# Patient Record
Sex: FEMALE | Race: WHITE | NOT HISPANIC OR LATINO | URBAN - METROPOLITAN AREA
[De-identification: names, ages, dates, MRNs, and addresses within clinical notes are randomized per-mention and may not be internally consistent; named-entity substitution may affect disease eponyms.]

---

## 2018-10-04 ENCOUNTER — INPATIENT (INPATIENT)
Facility: HOSPITAL | Age: 83
LOS: 5 days | Discharge: HOME | DRG: 871 | End: 2018-10-10
Attending: INTERNAL MEDICINE | Admitting: INTERNAL MEDICINE
Payer: MEDICARE

## 2018-10-04 VITALS
SYSTOLIC BLOOD PRESSURE: 84 MMHG | HEART RATE: 79 BPM | TEMPERATURE: 98 F | DIASTOLIC BLOOD PRESSURE: 59 MMHG | OXYGEN SATURATION: 92 % | RESPIRATION RATE: 12 BRPM

## 2018-10-04 DIAGNOSIS — R41.82 ALTERED MENTAL STATUS, UNSPECIFIED: ICD-10-CM

## 2018-10-04 DIAGNOSIS — I63.412 CEREBRAL INFARCTION DUE TO EMBOLISM OF LEFT MIDDLE CEREBRAL ARTERY: ICD-10-CM

## 2018-10-04 LAB
ALBUMIN SERPL ELPH-MCNC: 4 G/DL — SIGNIFICANT CHANGE UP (ref 3.3–5.2)
ALP SERPL-CCNC: 93 U/L — SIGNIFICANT CHANGE UP (ref 40–120)
ALT FLD-CCNC: 14 U/L — SIGNIFICANT CHANGE UP
ANION GAP SERPL CALC-SCNC: 18 MMOL/L — HIGH (ref 5–17)
APTT BLD: 26.9 SEC — LOW (ref 27.5–37.4)
AST SERPL-CCNC: 25 U/L — SIGNIFICANT CHANGE UP
BASE EXCESS BLDV CALC-SCNC: -4.8 MMOL/L — LOW (ref -2–2)
BILIRUB SERPL-MCNC: 0.4 MG/DL — SIGNIFICANT CHANGE UP (ref 0.4–2)
BUN SERPL-MCNC: 19 MG/DL — SIGNIFICANT CHANGE UP (ref 8–20)
CA-I SERPL-SCNC: 1.05 MMOL/L — LOW (ref 1.15–1.33)
CALCIUM SERPL-MCNC: 8.9 MG/DL — SIGNIFICANT CHANGE UP (ref 8.6–10.2)
CHLORIDE BLDV-SCNC: 106 MMOL/L — SIGNIFICANT CHANGE UP (ref 98–107)
CHLORIDE SERPL-SCNC: 101 MMOL/L — SIGNIFICANT CHANGE UP (ref 98–107)
CK SERPL-CCNC: 99 U/L — SIGNIFICANT CHANGE UP (ref 25–170)
CO2 SERPL-SCNC: 21 MMOL/L — LOW (ref 22–29)
CREAT SERPL-MCNC: 1.04 MG/DL — SIGNIFICANT CHANGE UP (ref 0.5–1.3)
EOSINOPHIL # BLD AUTO: 0 K/UL — SIGNIFICANT CHANGE UP (ref 0–0.5)
EOSINOPHIL NFR BLD AUTO: 0.3 % — SIGNIFICANT CHANGE UP (ref 0–6)
GAS PNL BLDV: 139 MMOL/L — SIGNIFICANT CHANGE UP (ref 135–145)
GAS PNL BLDV: SIGNIFICANT CHANGE UP
GAS PNL BLDV: SIGNIFICANT CHANGE UP
GLUCOSE BLDV-MCNC: 146 MG/DL — HIGH (ref 70–99)
GLUCOSE SERPL-MCNC: 148 MG/DL — HIGH (ref 70–115)
HCO3 BLDV-SCNC: 20 MMOL/L — LOW (ref 21–29)
HCT VFR BLD CALC: 52.6 % — HIGH (ref 37–47)
HCT VFR BLDA CALC: 51 — HIGH (ref 39–50)
HGB BLD CALC-MCNC: 16.7 G/DL — HIGH (ref 11.5–15.5)
HGB BLD-MCNC: 16.3 G/DL — HIGH (ref 12–16)
INR BLD: 1 RATIO — SIGNIFICANT CHANGE UP (ref 0.88–1.16)
LACTATE BLDV-MCNC: 4.4 MMOL/L — CRITICAL HIGH (ref 0.5–2)
LIDOCAIN IGE QN: 28 U/L — SIGNIFICANT CHANGE UP (ref 22–51)
LYMPHOCYTES # BLD AUTO: 3.2 K/UL — SIGNIFICANT CHANGE UP (ref 1–4.8)
LYMPHOCYTES # BLD AUTO: 30.4 % — SIGNIFICANT CHANGE UP (ref 20–55)
MCHC RBC-ENTMCNC: 29.8 PG — SIGNIFICANT CHANGE UP (ref 27–31)
MCHC RBC-ENTMCNC: 31 G/DL — LOW (ref 32–36)
MCV RBC AUTO: 96.2 FL — SIGNIFICANT CHANGE UP (ref 81–99)
MONOCYTES # BLD AUTO: 0.3 K/UL — SIGNIFICANT CHANGE UP (ref 0–0.8)
MONOCYTES NFR BLD AUTO: 2.6 % — LOW (ref 3–10)
NEUTROPHILS # BLD AUTO: 6.9 K/UL — SIGNIFICANT CHANGE UP (ref 1.8–8)
NEUTROPHILS NFR BLD AUTO: 66.4 % — SIGNIFICANT CHANGE UP (ref 37–73)
OTHER CELLS CSF MANUAL: 20 ML/DL — SIGNIFICANT CHANGE UP (ref 18–22)
PCO2 BLDV: 58 MMHG — HIGH (ref 35–50)
PH BLDV: 7.23 — LOW (ref 7.32–7.43)
PLATELET # BLD AUTO: 326 K/UL — SIGNIFICANT CHANGE UP (ref 150–400)
PO2 BLDV: 61 MMHG — HIGH (ref 25–45)
POTASSIUM BLDV-SCNC: 7.3 MMOL/L — CRITICAL HIGH (ref 3.4–4.5)
POTASSIUM SERPL-MCNC: 4 MMOL/L — SIGNIFICANT CHANGE UP (ref 3.5–5.3)
POTASSIUM SERPL-SCNC: 4 MMOL/L — SIGNIFICANT CHANGE UP (ref 3.5–5.3)
PROT SERPL-MCNC: 7.3 G/DL — SIGNIFICANT CHANGE UP (ref 6.6–8.7)
PROTHROM AB SERPL-ACNC: 11 SEC — SIGNIFICANT CHANGE UP (ref 9.8–12.7)
RBC # BLD: 5.47 M/UL — HIGH (ref 4.4–5.2)
RBC # FLD: 13.7 % — SIGNIFICANT CHANGE UP (ref 11–15.6)
SAO2 % BLDV: 88 % — SIGNIFICANT CHANGE UP
SODIUM SERPL-SCNC: 140 MMOL/L — SIGNIFICANT CHANGE UP (ref 135–145)
TROPONIN T SERPL-MCNC: <0.01 NG/ML — SIGNIFICANT CHANGE UP (ref 0–0.06)
WBC # BLD: 10.4 K/UL — SIGNIFICANT CHANGE UP (ref 4.8–10.8)
WBC # FLD AUTO: 10.4 K/UL — SIGNIFICANT CHANGE UP (ref 4.8–10.8)

## 2018-10-04 PROCEDURE — 71045 X-RAY EXAM CHEST 1 VIEW: CPT | Mod: 26

## 2018-10-04 PROCEDURE — 99291 CRITICAL CARE FIRST HOUR: CPT

## 2018-10-04 PROCEDURE — 99292 CRITICAL CARE ADDL 30 MIN: CPT

## 2018-10-04 PROCEDURE — 93010 ELECTROCARDIOGRAM REPORT: CPT

## 2018-10-04 PROCEDURE — 70450 CT HEAD/BRAIN W/O DYE: CPT | Mod: 26

## 2018-10-04 RX ORDER — SODIUM CHLORIDE 9 MG/ML
2000 INJECTION INTRAMUSCULAR; INTRAVENOUS; SUBCUTANEOUS ONCE
Qty: 0 | Refills: 0 | Status: COMPLETED | OUTPATIENT
Start: 2018-10-04 | End: 2018-10-04

## 2018-10-04 RX ORDER — ALTEPLASE 100 MG
48 KIT INTRAVENOUS ONCE
Qty: 0 | Refills: 0 | Status: COMPLETED | OUTPATIENT
Start: 2018-10-04 | End: 2018-10-04

## 2018-10-04 RX ORDER — ONDANSETRON 8 MG/1
8 TABLET, FILM COATED ORAL ONCE
Qty: 0 | Refills: 0 | Status: COMPLETED | OUTPATIENT
Start: 2018-10-04 | End: 2018-10-04

## 2018-10-04 RX ORDER — ALTEPLASE 100 MG
5 KIT INTRAVENOUS ONCE
Qty: 0 | Refills: 0 | Status: COMPLETED | OUTPATIENT
Start: 2018-10-04 | End: 2018-10-04

## 2018-10-04 RX ORDER — VANCOMYCIN HCL 1 G
1000 VIAL (EA) INTRAVENOUS ONCE
Qty: 0 | Refills: 0 | Status: COMPLETED | OUTPATIENT
Start: 2018-10-04 | End: 2018-10-04

## 2018-10-04 RX ORDER — PIPERACILLIN AND TAZOBACTAM 4; .5 G/20ML; G/20ML
3.38 INJECTION, POWDER, LYOPHILIZED, FOR SOLUTION INTRAVENOUS ONCE
Qty: 0 | Refills: 0 | Status: COMPLETED | OUTPATIENT
Start: 2018-10-04 | End: 2018-10-04

## 2018-10-04 RX ADMIN — ALTEPLASE 300 MILLIGRAM(S): KIT at 22:27

## 2018-10-04 RX ADMIN — PIPERACILLIN AND TAZOBACTAM 200 GRAM(S): 4; .5 INJECTION, POWDER, LYOPHILIZED, FOR SOLUTION INTRAVENOUS at 21:15

## 2018-10-04 RX ADMIN — ALTEPLASE 48 MILLIGRAM(S): KIT at 22:30

## 2018-10-04 RX ADMIN — ONDANSETRON 8 MILLIGRAM(S): 8 TABLET, FILM COATED ORAL at 19:40

## 2018-10-04 RX ADMIN — SODIUM CHLORIDE 2000 MILLILITER(S): 9 INJECTION INTRAMUSCULAR; INTRAVENOUS; SUBCUTANEOUS at 19:40

## 2018-10-04 RX ADMIN — Medication 250 MILLIGRAM(S): at 23:35

## 2018-10-04 NOTE — STROKE CODE NOTE - PROBLEM SELECTOR PLAN 1
Plan:   - Risks, benefits and adverse reactions associated with IV tPA including hemorrhagic stroke were discussed with patient and available family member in detail. After ruling out contraindications and obtaining verbal consent, patient would be treated with IV tPA in the ED  - Cont with IV tPA precautions including BP goal<185/110 mmHg before the bolus and <180/105 mmHg for first 24 hours after bolus followed by gradual normotension  - Not a candidate for neurointervention due to pre-stroke, dependent functional baseline  - Aspirin and pharmacological DVT prophylaxis to be considered 24 hours after the IV tPA and repeat brain imaging. SCDs for DVT prophylaxis in the interim   - Atorvastatin 80 mg after establishing enteral access or passing swallow evaluation  - TTE with bubble study and telemetry to screen for possible cardiac source of embolism  - LDL and HbA1C, continue with aggressive vascular risk factors modifications   - PT/OT/Speech and swallow evaluation     Above mentioned plan was discussed with patient, available family member/her son and [facility] ED MD in detail. All the questions were answered and concerns were addressed.

## 2018-10-04 NOTE — ED ADULT NURSE NOTE - CHIEF COMPLAINT QUOTE
Patient responsive to voice, vomiting. EMS stated patient found unresponsive by nursing home staff, Dr. Inman at bedside for evaluation, code sepsis called.

## 2018-10-04 NOTE — STROKE CODE NOTE - SUBJECTIVE
89 years old woman was reported to be last known well time being at 6:30 PM, when she was treated with IV ceftriaxone. About 20 minutes thereafter, she was reported to have acute onset of "altered mental status" And she was brought to OSH for further evaluation. Upon presentation to OSH, she was noted to have low blood pressure and was treated with fluid resuscitation. She is reported to be afebrile upon arrival. Given after fluid resuscitation, she continued to have language disturbance in the form of expressive and receptive aphasia. As per her son, she is dependent on her ADLs.

## 2018-10-04 NOTE — ED PROVIDER NOTE - UNABLE TO OBTAIN
Unable to obtain HPI secondary to patient's acute condition Severe Illness/Injury Unable to obtain ROS secondary to patient's current acute condition

## 2018-10-04 NOTE — ED PROVIDER NOTE - PROGRESS NOTE DETAILS
Code stroke called Spoke with Dr. Nunez, who received story and states will call back with recommendations Dr. Allison called back, assessed the patient and recommends TPA at this time. After a lengthy discussion between family and Dr. Allison, the risks and benefits of TPA were discussed and the family agrees to administer TPA at this time. As per neurology and ED attendings recommendations, TPA will be administered at this time. Had an extensive converstation with family about the risks of bleeding at this age and timing, explained all the other possible reasons for the pt's status, proxy (son and daughter)   Mustapha Garcia (son), Juanita (daughter) - would like to proceed with TPA Had an extensive conversation with family about the risks of bleeding at this age and timing, explained all the other possible reasons for the pt's status, proxy (son and daughter)   Mustapha Garcia (son), Juanita (daughter) - would like to proceed with TPA

## 2018-10-04 NOTE — ED ADULT NURSE NOTE - NS PRO AD PATIENT TYPE
Medical Orders for Life-Sustaining Treatment (MOLST) Medical Orders for Life-Sustaining Treatment (MOLST)/Do Not Resuscitate (DNR)

## 2018-10-04 NOTE — ED PROVIDER NOTE - NEUROLOGICAL, MLM
No observable focal neurological deficits intermittently following simple commands, not following 2 step commands

## 2018-10-04 NOTE — ED PROVIDER NOTE - CRITICAL CARE PROVIDED
additional history taking/direct patient care (not related to procedure)/documentation/consult w/ pt's family directly relating to pts condition/consultation with other physicians additional history taking/direct patient care (not related to procedure)/documentation/telephone consultation with the patient's family/consult w/ pt's family directly relating to pts condition/consultation with other physicians

## 2018-10-04 NOTE — ED ADULT NURSE NOTE - OBJECTIVE STATEMENT
Pt. brought in from nursing home unresponsive and vomiting.  code sepsis called and code team 2 called to bedside.  MD Inman at bedside

## 2018-10-04 NOTE — ED PROVIDER NOTE - ENMT, MLM
Airway patent, Nasal mucosa clear. Mouth with some vomitus otherwise normal mucosa. Throat has no vesicles, no oropharyngeal exudates and uvula is midline.

## 2018-10-04 NOTE — ED PROVIDER NOTE - OBJECTIVE STATEMENT
89 y/o F with PMHx of HTN, HLD, thyroid disease, repeated falls, multiple PNAs, anemia, CKD presents to ED from Nursing Home due to being found unresponsive s/p eating tonight. As per paperwork that patient arrived with, she is currently being treated for a UTI (abx recently changed to Ceftriaxone injection, first dose today around 1800). Patient is unable to provide history at this time secondary to current state.     Daughter in law at bedside states at baseline, the patient is alert and interactive but currently it appears like she is slurring her words. Last known normal was today around 1330 when the patient was speaking normally with another family member. 89 y/o F with PMHx of HTN, HLD, thyroid disease, meningioma (with resection), repeated falls, multiple PNAs, anemia, CKD presents to ED from Nursing Home due to being found unresponsive by nursing home staff s/p receiving Ceftriaxone injection about 20 minutes prior to arrival. As per paperwork that patient arrived with, she is currently being treated for a UTI (abx recently changed, got the injection of Ceftriaxone for the first time today around 1830). Patient is unable to provide history at this time secondary to current state.     Daughter in law at bedside states at baseline, the patient is alert and oriented x3, and interactive but currently it appears like she is slurring her words and is more lethargic than normal. Last known normal was today around 1330 when the patient was speaking normally and was interacting at her baseline with another family member. As per son, when the patient has a UTI she normally goes into sepsis. At baseline, the patient is wheel chair bound.     Code sepsis initiated upon patient arrival. 89 y/o F with PMHx of HTN, HLD, thyroid disease, meningioma (with resection), repeated falls, multiple PNAs, anemia, CKD presents to ED from Nursing Home due to being found unresponsive by nursing home staff s/p receiving Ceftriaxone injection about 20 minutes prior to arrival. As per paperwork that patient arrived with, she is currently being treated for a UTI (abx recently changed, got the injection of Ceftriaxone for the first time today around 1830). Patient is unable to provide history at this time secondary to current state. Allergic to erythromycin and latex.     Daughter in law at bedside states at baseline, the patient is alert and oriented x3, and interactive but currently it appears like she is slurring her words and is more lethargic than normal. Last known normal was today around 1330 when the patient was speaking normally and was interacting at her baseline with another family member. As per son, when the patient has a UTI she normally goes into sepsis. At baseline, the patient is wheel chair bound.     Code sepsis initiated upon patient arrival. 89 y/o F with PMHx of HTN, HLD, thyroid disease, CAD, meningioma (with resection), repeated falls, multiple PNAs, anemia, CKD presents to ED from Nursing Home due to being found unresponsive by nursing home staff s/p receiving Ceftriaxone injection about 20 minutes prior to arrival. As per paperwork that patient arrived with, she is currently being treated for a UTI (abx recently changed, got the injection of Ceftriaxone for the first time today around 1830). Patient is unable to provide history at this time secondary to current state. Allergic to erythromycin and latex.     Daughter in law at bedside states at baseline, the patient is alert and oriented x3, and interactive but currently it appears like she is slurring her words and is more lethargic than normal. Last known normal was today around 1330 when the patient was speaking normally and was interacting at her baseline with another family member. As per son, when the patient has a UTI she normally goes into sepsis. At baseline, the patient is wheel chair bound.     Code sepsis initiated upon patient arrival.

## 2018-10-04 NOTE — ED PROVIDER NOTE - MEDICAL DECISION MAKING DETAILS
89 y/o F with PMHx of HTN, HLD, thyroid disease, meningioma (with resection), repeated falls, multiple PNAs, anemia, CKD presents to ED from Nursing Home due to being found unresponsive by nursing home staff s/p receiving Ceftriaxone injection about 20 minutes prior to arrival.

## 2018-10-05 DIAGNOSIS — Z98.890 OTHER SPECIFIED POSTPROCEDURAL STATES: Chronic | ICD-10-CM

## 2018-10-05 DIAGNOSIS — N39.0 URINARY TRACT INFECTION, SITE NOT SPECIFIED: ICD-10-CM

## 2018-10-05 DIAGNOSIS — A41.9 SEPSIS, UNSPECIFIED ORGANISM: ICD-10-CM

## 2018-10-05 DIAGNOSIS — E87.2 ACIDOSIS: ICD-10-CM

## 2018-10-05 DIAGNOSIS — G93.40 ENCEPHALOPATHY, UNSPECIFIED: ICD-10-CM

## 2018-10-05 DIAGNOSIS — R09.02 HYPOXEMIA: ICD-10-CM

## 2018-10-05 LAB
ANION GAP SERPL CALC-SCNC: 13 MMOL/L — SIGNIFICANT CHANGE UP (ref 5–17)
APPEARANCE UR: ABNORMAL
BACTERIA # UR AUTO: ABNORMAL
BILIRUB UR-MCNC: NEGATIVE — SIGNIFICANT CHANGE UP
BUN SERPL-MCNC: 21 MG/DL — HIGH (ref 8–20)
CALCIUM SERPL-MCNC: 8 MG/DL — LOW (ref 8.6–10.2)
CHLORIDE SERPL-SCNC: 105 MMOL/L — SIGNIFICANT CHANGE UP (ref 98–107)
CO2 SERPL-SCNC: 22 MMOL/L — SIGNIFICANT CHANGE UP (ref 22–29)
COLOR SPEC: YELLOW — SIGNIFICANT CHANGE UP
CREAT SERPL-MCNC: 1.14 MG/DL — SIGNIFICANT CHANGE UP (ref 0.5–1.3)
DIFF PNL FLD: ABNORMAL
EPI CELLS # UR: ABNORMAL
GLUCOSE SERPL-MCNC: 138 MG/DL — HIGH (ref 70–115)
GLUCOSE UR QL: NEGATIVE MG/DL — SIGNIFICANT CHANGE UP
GRAN CASTS # UR COMP ASSIST: ABNORMAL /LPF
HCT VFR BLD CALC: 42.9 % — SIGNIFICANT CHANGE UP (ref 37–47)
HGB BLD-MCNC: 13 G/DL — SIGNIFICANT CHANGE UP (ref 12–16)
KETONES UR-MCNC: NEGATIVE — SIGNIFICANT CHANGE UP
LACTATE SERPL-SCNC: 2.1 MMOL/L — HIGH (ref 0.5–2)
LEUKOCYTE ESTERASE UR-ACNC: ABNORMAL
LYMPHOCYTES # BLD AUTO: 2 % — LOW (ref 20–55)
MAGNESIUM SERPL-MCNC: 1.8 MG/DL — SIGNIFICANT CHANGE UP (ref 1.6–2.6)
MCHC RBC-ENTMCNC: 29.1 PG — SIGNIFICANT CHANGE UP (ref 27–31)
MCHC RBC-ENTMCNC: 30.3 G/DL — LOW (ref 32–36)
MCV RBC AUTO: 96.2 FL — SIGNIFICANT CHANGE UP (ref 81–99)
MONOCYTES NFR BLD AUTO: 4 % — SIGNIFICANT CHANGE UP (ref 3–10)
MRSA PCR RESULT.: SIGNIFICANT CHANGE UP
NEUTROPHILS NFR BLD AUTO: 92 % — HIGH (ref 37–73)
NEUTS BAND # BLD: 2 % — SIGNIFICANT CHANGE UP (ref 0–8)
NITRITE UR-MCNC: NEGATIVE — SIGNIFICANT CHANGE UP
PH UR: 5 — SIGNIFICANT CHANGE UP (ref 5–8)
PHOSPHATE SERPL-MCNC: 2.7 MG/DL — SIGNIFICANT CHANGE UP (ref 2.4–4.7)
PLAT MORPH BLD: NORMAL — SIGNIFICANT CHANGE UP
PLATELET # BLD AUTO: 254 K/UL — SIGNIFICANT CHANGE UP (ref 150–400)
PLATELET COUNT - ESTIMATE: ADEQUATE — SIGNIFICANT CHANGE UP
POTASSIUM SERPL-MCNC: 4 MMOL/L — SIGNIFICANT CHANGE UP (ref 3.5–5.3)
POTASSIUM SERPL-SCNC: 4 MMOL/L — SIGNIFICANT CHANGE UP (ref 3.5–5.3)
PROT UR-MCNC: 100 MG/DL
RBC # BLD: 4.46 M/UL — SIGNIFICANT CHANGE UP (ref 4.4–5.2)
RBC # FLD: 13.7 % — SIGNIFICANT CHANGE UP (ref 11–15.6)
RBC BLD AUTO: NORMAL — SIGNIFICANT CHANGE UP
RBC CASTS # UR COMP ASSIST: SIGNIFICANT CHANGE UP /HPF (ref 0–4)
S AUREUS DNA NOSE QL NAA+PROBE: SIGNIFICANT CHANGE UP
SODIUM SERPL-SCNC: 140 MMOL/L — SIGNIFICANT CHANGE UP (ref 135–145)
SP GR SPEC: 1.01 — SIGNIFICANT CHANGE UP (ref 1.01–1.02)
UROBILINOGEN FLD QL: NEGATIVE MG/DL — SIGNIFICANT CHANGE UP
WBC # BLD: 21.1 K/UL — HIGH (ref 4.8–10.8)
WBC # FLD AUTO: 21.1 K/UL — HIGH (ref 4.8–10.8)
WBC UR QL: >50

## 2018-10-05 PROCEDURE — 99291 CRITICAL CARE FIRST HOUR: CPT

## 2018-10-05 PROCEDURE — 70450 CT HEAD/BRAIN W/O DYE: CPT | Mod: 26

## 2018-10-05 RX ORDER — ATENOLOL 25 MG/1
25 TABLET ORAL ONCE
Qty: 0 | Refills: 0 | Status: COMPLETED | OUTPATIENT
Start: 2018-10-05 | End: 2018-10-05

## 2018-10-05 RX ORDER — PIPERACILLIN AND TAZOBACTAM 4; .5 G/20ML; G/20ML
3.38 INJECTION, POWDER, LYOPHILIZED, FOR SOLUTION INTRAVENOUS EVERY 8 HOURS
Qty: 0 | Refills: 0 | Status: DISCONTINUED | OUTPATIENT
Start: 2018-10-05 | End: 2018-10-08

## 2018-10-05 RX ORDER — ISOSORBIDE MONONITRATE 60 MG/1
30 TABLET, EXTENDED RELEASE ORAL AT BEDTIME
Qty: 0 | Refills: 0 | Status: DISCONTINUED | OUTPATIENT
Start: 2018-10-05 | End: 2018-10-10

## 2018-10-05 RX ORDER — MAGNESIUM SULFATE 500 MG/ML
2 VIAL (ML) INJECTION ONCE
Qty: 0 | Refills: 0 | Status: COMPLETED | OUTPATIENT
Start: 2018-10-05 | End: 2018-10-05

## 2018-10-05 RX ORDER — ATENOLOL 25 MG/1
25 TABLET ORAL DAILY
Qty: 0 | Refills: 0 | Status: DISCONTINUED | OUTPATIENT
Start: 2018-10-06 | End: 2018-10-10

## 2018-10-05 RX ORDER — FAMOTIDINE 10 MG/ML
20 INJECTION INTRAVENOUS AT BEDTIME
Qty: 0 | Refills: 0 | Status: DISCONTINUED | OUTPATIENT
Start: 2018-10-05 | End: 2018-10-10

## 2018-10-05 RX ORDER — SERTRALINE 25 MG/1
25 TABLET, FILM COATED ORAL DAILY
Qty: 0 | Refills: 0 | Status: DISCONTINUED | OUTPATIENT
Start: 2018-10-05 | End: 2018-10-10

## 2018-10-05 RX ORDER — CHLORHEXIDINE GLUCONATE 213 G/1000ML
1 SOLUTION TOPICAL ONCE
Qty: 0 | Refills: 0 | Status: COMPLETED | OUTPATIENT
Start: 2018-10-05 | End: 2018-10-05

## 2018-10-05 RX ORDER — FERROUS SULFATE 325(65) MG
325 TABLET ORAL DAILY
Qty: 0 | Refills: 0 | Status: DISCONTINUED | OUTPATIENT
Start: 2018-10-05 | End: 2018-10-10

## 2018-10-05 RX ORDER — CALCIUM CARBONATE 500(1250)
1 TABLET ORAL EVERY 6 HOURS
Qty: 0 | Refills: 0 | Status: DISCONTINUED | OUTPATIENT
Start: 2018-10-05 | End: 2018-10-10

## 2018-10-05 RX ADMIN — SERTRALINE 25 MILLIGRAM(S): 25 TABLET, FILM COATED ORAL at 12:59

## 2018-10-05 RX ADMIN — PIPERACILLIN AND TAZOBACTAM 25 GRAM(S): 4; .5 INJECTION, POWDER, LYOPHILIZED, FOR SOLUTION INTRAVENOUS at 14:27

## 2018-10-05 RX ADMIN — ATENOLOL 25 MILLIGRAM(S): 25 TABLET ORAL at 12:59

## 2018-10-05 RX ADMIN — CHLORHEXIDINE GLUCONATE 1 APPLICATION(S): 213 SOLUTION TOPICAL at 15:45

## 2018-10-05 RX ADMIN — Medication 325 MILLIGRAM(S): at 12:59

## 2018-10-05 RX ADMIN — Medication 1 TABLET(S): at 14:49

## 2018-10-05 RX ADMIN — Medication 50 GRAM(S): at 08:29

## 2018-10-05 RX ADMIN — ISOSORBIDE MONONITRATE 30 MILLIGRAM(S): 60 TABLET, EXTENDED RELEASE ORAL at 21:16

## 2018-10-05 RX ADMIN — PIPERACILLIN AND TAZOBACTAM 25 GRAM(S): 4; .5 INJECTION, POWDER, LYOPHILIZED, FOR SOLUTION INTRAVENOUS at 21:16

## 2018-10-05 RX ADMIN — FAMOTIDINE 20 MILLIGRAM(S): 10 INJECTION INTRAVENOUS at 21:16

## 2018-10-05 NOTE — H&P ADULT - PMH
Anemia    CKD (chronic kidney disease)    Coronary artery disease    Essential hypertension    Hyperlipemia    Meningioma

## 2018-10-05 NOTE — PROGRESS NOTE ADULT - PROBLEM SELECTOR PLAN 3
Improved, now tolerating nasal cannula, titrate to SpO2 > 90%  Swallow saw the patient and cleared for mechanical soft which she tolerated fine

## 2018-10-05 NOTE — H&P ADULT - NEUROLOGICAL DETAILS
lethargic, able to sustain awakening and eye opening, oriented to first name, recognizes family at bedside, unable to answer other questions

## 2018-10-05 NOTE — PROGRESS NOTE ADULT - SUBJECTIVE AND OBJECTIVE BOX
INTERVAL HPI/OVERNIGHT EVENTS:   No overnight events  Patient more awake      Antimicrobial:  piperacillin/tazobactam IVPB. 3.375 Gram(s) IV Intermittent every 8 hours    Cardiovascular:  isosorbide   mononitrate ER Tablet (IMDUR) 30 milliGRAM(s) Oral at bedtime      Other:  bisacodyl Suppository 10 milliGRAM(s) Rectal daily PRN  calcium carbonate    500 mG (Tums) Chewable 1 Tablet(s) Chew every 6 hours PRN  chlorhexidine 2% Cloths 1 Application(s) Topical once  famotidine    Tablet 20 milliGRAM(s) Oral at bedtime  ferrous    sulfate 325 milliGRAM(s) Oral daily  sertraline 25 milliGRAM(s) Oral daily      Drug Dosing Weight    Weight (kg): 59.4 (04 Oct 2018 22:05)    CENTRAL LINE: [ ] YES [ ] NO  LOCATION:   DATE INSERTED:    ORNELAS: [ ] YES [* ] NO    DATE INSERTED:    A-LINE:  [ ] YES [* ] NO  LOCATION:   DATE INSERTED:    PMH/Social Hx/Fam Hx -reviewed admission note, no change since admission  PAST MEDICAL & SURGICAL HISTORY:  Anemia  CKD (chronic kidney disease)  Meningioma  Coronary artery disease  Hyperlipemia  Essential hypertension  History of resection of meningioma      T(C): 37.2 (10-05-18 @ 12:00), Max: 37.2 (10-05-18 @ 12:00)  HR: 75 (10-05-18 @ 14:00)  BP: 160/68 (10-05-18 @ 14:00)  BP(mean): 98 (10-05-18 @ 14:00)  ABP: --  ABP(mean): --  RR: 14 (10-05-18 @ 14:00)  SpO2: 97% (10-05-18 @ 14:00)  Wt(kg): --                  PHYSICAL EXAM:    GENERAL: [x]NAD, [ ]well-groomed, [ ]well-developed;  [ ]  HEAD:  [x ]Atraumatic, [x ]Normocephalic;  [ ]  EYES: [ x]EOMI, [ x]PERRLA, [ ]conjunctiva and sclera clear;   [ ]  ENMT: [ ]No tonsillar erythema, exudates, or enlargement; [x ]Moist mucous membranes, [ ]Good dentition, [ ]No lesions; [ ]  NECK: [x ]Supple, normal appearance, [x ]No JVD; [ ]Normal thyroid; [ ]Trachea midline; [ ]  NERVOUS SYSTEM:  [x ]Alert & Oriented X3, [ ]Good concentration; [ ]Motor Strength 5/5 B/L upper and lower extremities; [ ]DTRs 2+ intact and symmetric; [ ]  CHEST/LUNG: [x ]No chest deformity; [ ]Normal percussion bilaterally; [x ]No rales, rhonchi, wheezing; [ ]Crackles at bases; [ ]  HEART: [x ]Regular rate and rhythm; [ ]No murmurs, rubs, or gallops;  [ ]  ABDOMEN: [ x]Soft, Nontender, Nondistended; [ ]Bowel sounds present;  [ ]  EXTREMITIES:  [x ]1+ Peripheral Pulses, [ x]No clubbing, cyanosis, or edema [ ]Bilat lower extremity edema;  [ ]  LYMPH: [x]No lymphadenopathy noted;  [ ]  SKIN: [ x]No acute rashes or lesions; [ ]Good capillary refill;  [ ]      LABS:  CBC Full  -  ( 05 Oct 2018 05:53 )  WBC Count : 21.1 K/uL  Hemoglobin : 13.0 g/dL  Hematocrit : 42.9 %  Platelet Count - Automated : 254 K/uL  Mean Cell Volume : 96.2 fl  Mean Cell Hemoglobin : 29.1 pg  Mean Cell Hemoglobin Concentration : 30.3 g/dL  Auto Neutrophil # : x  Auto Lymphocyte # : x  Auto Monocyte # : x  Auto Eosinophil # : x  Auto Basophil # : x  Auto Neutrophil % : 92.0 %  Auto Lymphocyte % : 2.0 %  Auto Monocyte % : 4.0 %  Auto Eosinophil % : x  Auto Basophil % : x    10-    140  |  105  |  21.0<H>  ----------------------------<  138<H>  4.0   |  22.0  |  1.14    Ca    8.0<L>      05 Oct 2018 05:53  Phos  2.7     10-05  Mg     1.8     10-    TPro  7.3  /  Alb  4.0  /  TBili  0.4  /  DBili  x   /  AST  25  /  ALT  14  /  AlkPhos  93  10-04    PT/INR - ( 04 Oct 2018 19:59 )   PT: 11.0 sec;   INR: 1.00 ratio         PTT - ( 04 Oct 2018 19:59 )  PTT:26.9 sec  Urinalysis Basic - ( 05 Oct 2018 06:10 )    Color: Yellow / Appearance: Slightly Turbid / S.015 / pH: x  Gluc: x / Ketone: Negative  / Bili: Negative / Urobili: Negative mg/dL   Blood: x / Protein: 100 mg/dL / Nitrite: Negative   Leuk Esterase: Moderate / RBC: 0-2 /HPF / WBC >50   Sq Epi: x / Non Sq Epi: Many / Bacteria: Many          CRITICAL CARE TIME SPENT: 35 minutes

## 2018-10-05 NOTE — PATIENT PROFILE ADULT. - VISION (WITH CORRECTIVE LENSES IF THE PATIENT USUALLY WEARS THEM):
Partially impaired: cannot see medication labels or newsprint, but can see obstacles in path, and the surrounding layout; can count fingers at arm's length/wears glasses for distant vision

## 2018-10-05 NOTE — H&P ADULT - ASSESSMENT
89 yo female, PMHx HTN, HLD, CAD on ASA/Plavix, bed/wheelchair bound, admitted with acute altered mental status likely encephalopathy, suspect related to possible hypoxemic event secondary to choking / aspiration and severe sepsis with septic shock due to UTI. Patient is s/p TPA for concerns for acute CVA.

## 2018-10-05 NOTE — H&P ADULT - HISTORY OF PRESENT ILLNESS
89 yo female, PMHx HTN, HLD, CAD on ASA/Plavix, bed/wheelchair bound, being treated with Ciprofloxacin for UTI x 3 days at nursing facility, switched today to Rocephin. Patient was given an injection of Rocephin, and found unresponsive 20 minutes later. Per patient's daughter, patient was found sitting up in her chair with her dinner tray unresponsive and cyanotic. (Family states she has a thyroid mass and does experience difficulty swallowing at times). Nursing supervisor told family they moved her to her bed and performed Carotid Massage and sternal rub to stimulate breathing. Patient began spontaneously breathing but remained deeply lethargic. Upon arrival to ED, patient had persistent cyanosis of distal extremities, BP 84/59, ABG revealed 7.23/58/61/20, lactate 4.4. Patient was fluid resuscitated in ED and BP recovered. Patient's mental status began to improve, but she remained confused and with slurred speech. A CT head was peformed which was negative for infarct or hemorrhage. A CODE STROKE was then called, NIH = 21, and TeleNeurology Dr. Allison advised to give TPA, initiated at 22:27.

## 2018-10-05 NOTE — PROGRESS NOTE ADULT - PROBLEM SELECTOR PLAN 1
Shock resolved with aggressive fluid resuscitation, received >30cc/kg fluid bolus  Start on broad spectrum antibiotics, likely UTI as source, has a history of resistant organisms. Cultures from NH reportedly resistant to Ciprofloxacin and was started on Rocephin  Repeat blood and urine cultures  trended lactate  Remains on zosyn

## 2018-10-05 NOTE — H&P ADULT - PROBLEM SELECTOR PLAN 3
Improved, now tolerating nasal cannula, titrate to SpO2 > 90%  Will need formal speech and swallow for dysphagia

## 2018-10-05 NOTE — H&P ADULT - RS GEN PE MLT RESP DETAILS PC
respirations non-labored/clear to auscultation bilaterally/no wheezes/no rales/breath sounds equal/good air movement/airway patent/no rhonchi

## 2018-10-05 NOTE — H&P ADULT - PROBLEM SELECTOR PLAN 1
Shock resolved with aggressive fluid resuscitation, received >30cc/kg fluid bolus  Start on broad spectrum antibiotics, likely UTI as source, has a history of resistant organisms. Cultures from NH reportedly resistant to Ciprofloxacin and was started on Rocephin  Repeat blood and urine cultures  Repeat lactate  Trend leukocytosis

## 2018-10-05 NOTE — H&P ADULT - PROBLEM SELECTOR PLAN 5
s/p TPA  follow post-TPA protocol  Check lipid panel, HgbA1c  TTE  PT/OT/speech  Neuro consult  Repeat CT head 24h post-TPA or sooner for acute changes in neuro status

## 2018-10-05 NOTE — PROGRESS NOTE ADULT - PROBLEM SELECTOR PLAN 4
Improving with resuscitation and s/p TPA  Will get MRI brain and get neuro eval if +findings  TTE  Neurochecks per protocol  Continue to monitor

## 2018-10-06 LAB
ALBUMIN SERPL ELPH-MCNC: 3.3 G/DL — SIGNIFICANT CHANGE UP (ref 3.3–5.2)
ALP SERPL-CCNC: 63 U/L — SIGNIFICANT CHANGE UP (ref 40–120)
ALT FLD-CCNC: 14 U/L — SIGNIFICANT CHANGE UP
ANION GAP SERPL CALC-SCNC: 12 MMOL/L — SIGNIFICANT CHANGE UP (ref 5–17)
AST SERPL-CCNC: 31 U/L — SIGNIFICANT CHANGE UP
BILIRUB SERPL-MCNC: 0.7 MG/DL — SIGNIFICANT CHANGE UP (ref 0.4–2)
BUN SERPL-MCNC: 21 MG/DL — HIGH (ref 8–20)
CALCIUM SERPL-MCNC: 8.7 MG/DL — SIGNIFICANT CHANGE UP (ref 8.6–10.2)
CHLORIDE SERPL-SCNC: 105 MMOL/L — SIGNIFICANT CHANGE UP (ref 98–107)
CO2 SERPL-SCNC: 22 MMOL/L — SIGNIFICANT CHANGE UP (ref 22–29)
CREAT SERPL-MCNC: 1.11 MG/DL — SIGNIFICANT CHANGE UP (ref 0.5–1.3)
CULTURE RESULTS: NO GROWTH — SIGNIFICANT CHANGE UP
GLUCOSE SERPL-MCNC: 86 MG/DL — SIGNIFICANT CHANGE UP (ref 70–115)
HCT VFR BLD CALC: 37.8 % — SIGNIFICANT CHANGE UP (ref 37–47)
HGB BLD-MCNC: 11.6 G/DL — LOW (ref 12–16)
INR BLD: 1.16 RATIO — SIGNIFICANT CHANGE UP (ref 0.88–1.16)
MAGNESIUM SERPL-MCNC: 2.2 MG/DL — SIGNIFICANT CHANGE UP (ref 1.8–2.6)
MCHC RBC-ENTMCNC: 29.3 PG — SIGNIFICANT CHANGE UP (ref 27–31)
MCHC RBC-ENTMCNC: 30.7 G/DL — LOW (ref 32–36)
MCV RBC AUTO: 95.5 FL — SIGNIFICANT CHANGE UP (ref 81–99)
PHOSPHATE SERPL-MCNC: 3.3 MG/DL — SIGNIFICANT CHANGE UP (ref 2.4–4.7)
PLATELET # BLD AUTO: 242 K/UL — SIGNIFICANT CHANGE UP (ref 150–400)
POTASSIUM SERPL-MCNC: 4.8 MMOL/L — SIGNIFICANT CHANGE UP (ref 3.5–5.3)
POTASSIUM SERPL-SCNC: 4.8 MMOL/L — SIGNIFICANT CHANGE UP (ref 3.5–5.3)
PROT SERPL-MCNC: 6 G/DL — LOW (ref 6.6–8.7)
PROTHROM AB SERPL-ACNC: 12.8 SEC — HIGH (ref 9.8–12.7)
RBC # BLD: 3.96 M/UL — LOW (ref 4.4–5.2)
RBC # FLD: 13.9 % — SIGNIFICANT CHANGE UP (ref 11–15.6)
SODIUM SERPL-SCNC: 139 MMOL/L — SIGNIFICANT CHANGE UP (ref 135–145)
SPECIMEN SOURCE: SIGNIFICANT CHANGE UP
WBC # BLD: 9.8 K/UL — SIGNIFICANT CHANGE UP (ref 4.8–10.8)
WBC # FLD AUTO: 9.8 K/UL — SIGNIFICANT CHANGE UP (ref 4.8–10.8)

## 2018-10-06 PROCEDURE — 99233 SBSQ HOSP IP/OBS HIGH 50: CPT

## 2018-10-06 PROCEDURE — 99223 1ST HOSP IP/OBS HIGH 75: CPT

## 2018-10-06 RX ORDER — CLOPIDOGREL BISULFATE 75 MG/1
75 TABLET, FILM COATED ORAL DAILY
Qty: 0 | Refills: 0 | Status: DISCONTINUED | OUTPATIENT
Start: 2018-10-06 | End: 2018-10-10

## 2018-10-06 RX ADMIN — SERTRALINE 25 MILLIGRAM(S): 25 TABLET, FILM COATED ORAL at 11:44

## 2018-10-06 RX ADMIN — ISOSORBIDE MONONITRATE 30 MILLIGRAM(S): 60 TABLET, EXTENDED RELEASE ORAL at 22:02

## 2018-10-06 RX ADMIN — PIPERACILLIN AND TAZOBACTAM 25 GRAM(S): 4; .5 INJECTION, POWDER, LYOPHILIZED, FOR SOLUTION INTRAVENOUS at 14:46

## 2018-10-06 RX ADMIN — Medication 325 MILLIGRAM(S): at 11:44

## 2018-10-06 RX ADMIN — ATENOLOL 25 MILLIGRAM(S): 25 TABLET ORAL at 05:18

## 2018-10-06 RX ADMIN — PIPERACILLIN AND TAZOBACTAM 25 GRAM(S): 4; .5 INJECTION, POWDER, LYOPHILIZED, FOR SOLUTION INTRAVENOUS at 05:18

## 2018-10-06 RX ADMIN — PIPERACILLIN AND TAZOBACTAM 25 GRAM(S): 4; .5 INJECTION, POWDER, LYOPHILIZED, FOR SOLUTION INTRAVENOUS at 22:02

## 2018-10-06 RX ADMIN — CLOPIDOGREL BISULFATE 75 MILLIGRAM(S): 75 TABLET, FILM COATED ORAL at 11:44

## 2018-10-06 RX ADMIN — FAMOTIDINE 20 MILLIGRAM(S): 10 INJECTION INTRAVENOUS at 22:02

## 2018-10-06 NOTE — PROGRESS NOTE ADULT - ASSESSMENT
89 yo female, PMHx HTN, HLD, CAD on ASA/Plavix, bed/wheelchair bound, admitted with acute altered mental status likely encephalopathy, suspect related to possible hypoxemic event secondary to choking / aspiration and severe sepsis with septic shock due to UTI. Patient is s/p TPA for concerns for acute CVA.     1: Severe sepsis from acute UTI: on antibx. blood c/s testing  2: Acute encephalopathy Resolved. likely sec to above  3: R/o CVA, S/p TPA. MRI pending. per Neuro OK to resume antiplatelets.   4: Acute hypoxic resp distress- resolved. Oxygen supplementation PRN. nebs PRN.  5: Resolved Lactic acidosis: likely sec to sepsis    ID:   Need to follow up cx and sensitivity from Nursing home ( on Monday) to de-escalate from pipe-tazo     ICDs for total 48 hrs post TpA

## 2018-10-06 NOTE — CONSULT NOTE ADULT - SUBJECTIVE AND OBJECTIVE BOX
[facility]                                        Neurology at [place]                                  [facility]                                      [address]                                           [place], [place], [address]                                                [phone]        CC: altered mental status     HISTORY:  The patient is a 89y Female who was given an injection of Rocephin for UTI, and found unresponsive 20 minutes later. Per patient's daughter, patient was found sitting up in her chair with her dinner tray unresponsive and cyanotic. Upon arrival to ED, patient had persistent cyanosis of distal extremities, BP 84/59, ABG revealed 7.23/58/61/20, lactate 4.4. Patient was fluid resuscitated in ED and BP recovered.   She remained somewhat confused and had slurred speech. Stroke code was called.   The patient was evaluated by the tele-medicine stroke service by "robot" and t-PA was administered.  She was admitted to MICU.   She now feels tired but otherwise no specific complaints.      PAST MEDICAL & SURGICAL HISTORY:  Anemia  CKD (chronic kidney disease)  Meningioma  Coronary artery disease  Hyperlipemia  Essential hypertension  History of resection of meningioma    MEDICATIONS  (STANDING):  ATENolol  Tablet 25 milliGRAM(s) Oral daily  clopidogrel Tablet 75 milliGRAM(s) Oral daily  famotidine    Tablet 20 milliGRAM(s) Oral at bedtime  ferrous    sulfate 325 milliGRAM(s) Oral daily  isosorbide   mononitrate ER Tablet (IMDUR) 30 milliGRAM(s) Oral at bedtime  piperacillin/tazobactam IVPB. 3.375 Gram(s) IV Intermittent every 8 hours  sertraline 25 milliGRAM(s) Oral daily    MEDICATIONS  (PRN):  bisacodyl Suppository 10 milliGRAM(s) Rectal daily PRN Constipation  calcium carbonate    500 mG (Tums) Chewable 1 Tablet(s) Chew every 6 hours PRN Dyspepsia    Allergies  erythromycin (Unknown)  latex (Unknown)    SOCIAL HISTORY:  Non smoker.     FAMILY HISTORY:  No significant history in first degree relatives.   Mother/Father .     ROS:  Constitutional: The patient denies fevers or weight changes.  Neuro: As per HPI.  Eyes: Denies blurry vision.  Ears/nose/throat: Denies Tinnitus.   Cardiac: Denies chest pain. Denies palpitations.  Respiratory: Denies shortness of breath.  GI: Denies abdominal pain, nausea, or vomiting.  : Denies change in urinary pattern.  Integumentary: Denies rash.  Psych: Denies recent mood changes.  Heme: denies easy bleeding/bruising.    Exam:  Vital Signs Last 24 Hrs  T(F): 98 (06 Oct 2018 07:35), Max: 99.3 (05 Oct 2018 16:00)  HR: 68 (06 Oct 2018 11:00) (55 - 79)  BP: 129/63 (06 Oct 2018 11:00) (114/54 - 160/68)  BP(mean): 90 (06 Oct 2018 11:00) (78 - 98)  RR: 20 (06 Oct 2018 11:00) (14 - 36)  SpO2: 98% (06 Oct 2018 11:00) (94% - 100%)  General: NAD.   Carotid bruits absent.     Mental status: The patient is awake, alert, and oriented to self, [facility], and to [facility]. There is no aphasia. There is no dysarthria.     Cranial nerves: There is no papilledema. Pupils react symmetrically to light. There is no visual field deficit to confrontation. Extraocular motion is full with no nystagmus. There is no ptosis. Facial sensation is intact. Facial musculature is symmetric. Palate elevates symmetrically. Tongue is midline.    Motor: There is normal bulk and tone.  Strength is 5/5 in the right arm and leg.   Strength is 5/5 in the left arm and leg.    Sensation: Intact to light touch and pin.    Reflexes: 2+ throughout and plantar responses are flexor.    Cerebellar: There is no dysmetria on finger to nose testing.    LABS:                         11.6   9.8   )-----------( 242      ( 06 Oct 2018 05:37 )             37.8       10-    139  |  105  |  21.0<H>  ----------------------------<  86  4.8   |  22.0  |  1.11    Ca    8.7      06 Oct 2018 05:37  Phos  3.3     10-  Mg     2.2     10-    TPro  6.0<L>  /  Alb  3.3  /  TBili  0.7  /  DBili  x   /  AST  31  /  ALT  14  /  AlkPhos  63  10-06      PT/INR - ( 06 Oct 2018 05:37 )   PT: 12.8 sec;   INR: 1.16 ratio    PTT - ( 04 Oct 2018 19:59 )  PTT:26.9 sec    RADIOLOGY   Initial CT head images reviewed (and concur with report): There is no acute pathology.   Repeat CT head images reviewed (and concur with report): There is no acute pathology.   There is chronic ischemic change.

## 2018-10-06 NOTE — CONSULT NOTE ADULT - ASSESSMENT
The patient is a 89y Female with alteration of mental status in setting of shock.   Now resolved.     Altered mental status   Now resolved.   Does not sound like CVA.   No objection to MRI brain.   OK to resume antiplatelet.     UTI.   On antibiotics per medical team.     Hypertension.  Control blood pressure.    Case discussed with MICU team (Dr Dinero attending).

## 2018-10-06 NOTE — PROGRESS NOTE ADULT - ASSESSMENT
89 yo female, PMHx HTN, HLD, CAD on ASA/Plavix, bed/wheelchair bound, admitted with acute altered mental status likely encephalopathy, suspect related to possible hypoxemic event secondary to choking / aspiration and severe sepsis with septic shock due to UTI. Patient is s/p TPA for concerns for acute CVA.     1: Severe sepsis from acute UTI  2: Acute encephaloapthy: Resolved  3: R/o CVA, S/p TPA  4: Acute hypoxic resp distress  5: Resolved Lactic acidosis:     Neuro:   Non-focal  S/p tPA > 24 hours  Will change neuro checks to q4 for now  Neuro cosulted  Pending MRI  Pending TTE/HbA1c/lipid panel/PT/OT/Speech/TSH      Cardiovascular:   Stable  S/p IVF  Resumed home dose plavix/atenolol/imdur    Resp/Chest:   Supplemental O2, to be weaned as tolerated    Gi/Nutrition:   Diet: Mechanical soft with thin liq  On bowel reg    ID:   Need to follow up cx and sensitivity from Nursing home to de-escalate from pipe-tazo     Nephro/Electrolyte/Acid-Base:   Stable    Endocrinology:   Stable    Haem/Oncology:   On iron supplements

## 2018-10-06 NOTE — PROGRESS NOTE ADULT - ATTENDING COMMENTS
D/w patient's son
Plan d/w with pt's son (HCP) at bedside extensively explaining medical condition, also ascertaining that patient is DNR but not DNI as long as possibility of reversal and return to baseline functional status

## 2018-10-06 NOTE — PROGRESS NOTE ADULT - SUBJECTIVE AND OBJECTIVE BOX
Patient is a 89y old  Female who presents with a chief complaint of r/o Acute CVA (05 Oct 2018 14:04)      BRIEF HOSPITAL COURSE:  89 yo female, PMHx HTN, HLD, CAD on ASA/Plavix, bed/wheelchair bound, being treated with Ciprofloxacin for UTI x 3 days at nursing facility, switched today to Rocephin. Patient was given an injection of Rocephin, and found unresponsive 20 minutes later. Per patient's daughter, patient was found sitting up in her chair with her dinner tray unresponsive and cyanotic. (Family states she has a thyroid mass and does experience difficulty swallowing at times). Nursing supervisor told family they moved her to her bed and performed Carotid Massage and sternal rub to stimulate breathing. Patient began spontaneously breathing but remained deeply lethargic. Upon arrival to ED, patient had persistent cyanosis of distal extremities, BP 84/59, ABG revealed 7.23/58/61/20, lactate 4.4. Patient was fluid resuscitated in ED and BP recovered. Patient's mental status began to improve, but she remained confused and with slurred speech. A CT head was peformed which was negative for infarct or hemorrhage. A CODE STROKE was then called, NIH = 21, and TeleNeurology Dr. Allison advised to give TPA, initiated at 22:27.     Events last 24 hours: NO overnight events      PAST MEDICAL & SURGICAL HISTORY:  Anemia  CKD (chronic kidney disease)  Meningioma  Coronary artery disease  Hyperlipemia  Essential hypertension  History of resection of meningioma      Review of Systems:  11 systems reviewed, no other symptoms besides what is explained above    Medications:  piperacillin/tazobactam IVPB. 3.375 Gram(s) IV Intermittent every 8 hours    ATENolol  Tablet 25 milliGRAM(s) Oral daily  isosorbide   mononitrate ER Tablet (IMDUR) 30 milliGRAM(s) Oral at bedtime      sertraline 25 milliGRAM(s) Oral daily      clopidogrel Tablet 75 milliGRAM(s) Oral daily  bisacodyl Suppository 10 milliGRAM(s) Rectal daily PRN  calcium carbonate    500 mG (Tums) Chewable 1 Tablet(s) Chew every 6 hours PRN  famotidine    Tablet 20 milliGRAM(s) Oral at bedtime  ferrous    sulfate 325 milliGRAM(s) Oral daily        ICU Vital Signs Last 24 Hrs  T(C): 36.7 (06 Oct 2018 07:35), Max: 37.4 (05 Oct 2018 16:00)  T(F): 98 (06 Oct 2018 07:35), Max: 99.3 (05 Oct 2018 16:00)  HR: 60 (06 Oct 2018 05:00) (60 - 79)  BP: 128/61 (06 Oct 2018 05:00) (116/56 - 160/68)  BP(mean): 88 (06 Oct 2018 05:00) (79 - 98)  ABP: --  ABP(mean): --  RR: 27 (06 Oct 2018 05:00) (14 - 36)  SpO2: 100% (06 Oct 2018 05:00) (94% - 100%)          I&O's Detail    05 Oct 2018 07:01  -  06 Oct 2018 07:00  --------------------------------------------------------  IN:    IV PiggyBack: 50 mL    Oral Fluid: 300 mL    Solution: 200 mL  Total IN: 550 mL    OUT:  Total OUT: 0 mL    Total NET: 550 mL            LABS:                        11.6   9.8   )-----------( 242      ( 06 Oct 2018 05:37 )             37.8     10-06    139  |  105  |  21.0<H>  ----------------------------<  86  4.8   |  22.0  |  1.11    Ca    8.7      06 Oct 2018 05:37  Phos  3.3     10-06  Mg     2.2     10-06    TPro  6.0<L>  /  Alb  3.3  /  TBili  0.7  /  DBili  x   /  AST  31  /  ALT  14  /  AlkPhos  63  10-06      CARDIAC MARKERS ( 04 Oct 2018 19:59 )  x     / <0.01 ng/mL / 99 U/L / x     / x          CAPILLARY BLOOD GLUCOSE      POCT Blood Glucose.: 117 mg/dL (04 Oct 2018 21:00)    PT/INR - ( 06 Oct 2018 05:37 )   PT: 12.8 sec;   INR: 1.16 ratio         PTT - ( 04 Oct 2018 19:59 )  PTT:26.9 sec  Urinalysis Basic - ( 05 Oct 2018 06:10 )    Color: Yellow / Appearance: Slightly Turbid / S.015 / pH: x  Gluc: x / Ketone: Negative  / Bili: Negative / Urobili: Negative mg/dL   Blood: x / Protein: 100 mg/dL / Nitrite: Negative   Leuk Esterase: Moderate / RBC: 0-2 /HPF / WBC >50   Sq Epi: x / Non Sq Epi: Many / Bacteria: Many      CULTURES:      Physical Examination:    General: No acute distress.  Alert, oriented, interactive, nonfocal    HEENT: Pupils equal, reactive to light.  Symmetric.    PULM: Clear to auscultation bilaterally, no significant sputum production    CVS: Regular rate and rhythm, no murmurs, rubs, or gallops    ABD: Soft, nondistended, nontender, normoactive bowel sounds, no masses    EXT: No edema, nontender    SKIN: Warm and well perfused, no rashes noted.        CRITICAL CARE TIME SPENT: 35

## 2018-10-06 NOTE — PROGRESS NOTE ADULT - SUBJECTIVE AND OBJECTIVE BOX
DOWNGRADE ACCEPTANCE NOTE:    HEALTH ISSUES - PROBLEM Dx:  89 yo female, PMHx HTN, HLD, CAD on ASA/Plavix, bed/wheelchair bound, being treated with Ciprofloxacin for UTI x 3 days at nursing facility, switched today to Rocephin. Patient was given an injection of Rocephin, and found unresponsive and cyanotic. (Family states she has a thyroid mass and does experience difficulty swallowing at times).    In  ED; BP 84/59, ABG revealed 7.23/58/61/20, lactate 4.4. Patient was fluid resuscitated in ED and BP recovered. Patient's mental status began to improve, but she remained confused and with slurred speech. A CT head was peformed which was negative for infarct or hemorrhage. A CODE STROKE was then called, NIH = 21, and TeleNeurology Dr. Allison advised to give TPA    In MICU: monitored post TpA. uneventful stay. likely presentation sec to sepsis sec to UTI. antibx initiated. D/G today.    INTERVAL HPI/ OVERNIGHT EVENTS:  feels tired. no focalization. comfortable otherwise    REVIEW OF SYSTEMS:    CONSTITUTIONAL: No fever,  EYES: No eyedischarge  ENMT:   No sinus or throat pain  NECK: No pain or stiffness  RESPIRATORY: No cough, wheezing, hemoptysis; No shortness of breath  CARDIOVASCULAR: No chest pain, palpitations, dizziness, or leg swelling  GASTROINTESTINAL: No abdominal or epigastric pain. No nausea, vomiting, or hematemesis; No diarrhea or constipation. No melena or hematochezia.  GENITOURINARY: No dysuria, frequency, hematuria, or incontinence  NEUROLOGICAL: No headaches, memory loss, loss of strength, numbness, or tremors  SKIN: No itching, burning, rashes, or lesions   MUSCULOSKELETAL: No joint pain or swelling; No muscle, back, or extremity pain  PSYCHIATRIC: No depression, anxiety, mood swings, or difficulty sleeping    Vital Signs Last 24 Hrs  T(C): 37.2 (06 Oct 2018 15:35), Max: 37.2 (05 Oct 2018 20:00)  T(F): 98.9 (06 Oct 2018 15:35), Max: 99 (05 Oct 2018 20:00)  HR: 72 (06 Oct 2018 15:35) (55 - 74)  BP: 115/63 (06 Oct 2018 15:35) (114/54 - 143/66)  BP(mean): 95 (06 Oct 2018 13:00) (78 - 95)  RR: 20 (06 Oct 2018 15:35) (14 - 39)  SpO2: 98% (06 Oct 2018 15:35) (95% - 100%)    PHYSICAL EXAM-  GENERAL: well-groomed, well-developed  HEAD:  Atraumatic, Normocephalic  EYES: EOMI, onjunctiva and sclera clear  ENMT:  Moist mucous membranes, o lesions  NECK: Supple, No JVD, Normal thyroid  NERVOUS SYSTEM:  Alert & Oriented X , No focal deficits; moving all extremitite   PULMONARY: CTA bruce; No rhonchi, rales    CARDIOVASCULAR: S1 S2 +, reg rate and rhythm; No murmurs, rubs, or gallops  ABDOMEN: Soft, Nontender, Nondistended; Bowel sounds present  EXTREMITIES:  2+ Peripheral Pulses, No clubbing, cyanosis, or edema  LYMPH: No lymphadenopathy noted  SKIN: No rashes or lesions    LABS:                        11.6   9.8   )-----------( 242      ( 06 Oct 2018 05:37 )             37.8     10-06    139  |  105  |  21.0<H>  ----------------------------<  86  4.8   |  22.0  |  1.11    Ca    8.7      06 Oct 2018 05:37  Phos  3.3     10-06  Mg     2.2     10-06    TPro  6.0<L>  /  Alb  3.3  /  TBili  0.7  /  DBili  x   /  AST  31  /  ALT  14  /  AlkPhos  63  10-06    PT/INR - ( 06 Oct 2018 05:37 )   PT: 12.8 sec;   INR: 1.16 ratio         PTT - ( 04 Oct 2018 19:59 )  PTT:26.9 sec  Urinalysis Basic - ( 05 Oct 2018 06:10 )    Color: Yellow / Appearance: Slightly Turbid / S.015 / pH: x  Gluc: x / Ketone: Negative  / Bili: Negative / Urobili: Negative mg/dL   Blood: x / Protein: 100 mg/dL / Nitrite: Negative   Leuk Esterase: Moderate / RBC: 0-2 /HPF / WBC >50   Sq Epi: x / Non Sq Epi: Many / Bacteria: Many    Assessment and Plan

## 2018-10-07 LAB
GLUCOSE BLDC GLUCOMTR-MCNC: 98 MG/DL — SIGNIFICANT CHANGE UP (ref 70–99)
NT-PROBNP SERPL-SCNC: 3017 PG/ML — HIGH (ref 0–300)

## 2018-10-07 PROCEDURE — 99232 SBSQ HOSP IP/OBS MODERATE 35: CPT

## 2018-10-07 PROCEDURE — 99233 SBSQ HOSP IP/OBS HIGH 50: CPT

## 2018-10-07 PROCEDURE — 71250 CT THORAX DX C-: CPT | Mod: 26

## 2018-10-07 RX ORDER — IPRATROPIUM/ALBUTEROL SULFATE 18-103MCG
3 AEROSOL WITH ADAPTER (GRAM) INHALATION EVERY 6 HOURS
Qty: 0 | Refills: 0 | Status: DISCONTINUED | OUTPATIENT
Start: 2018-10-07 | End: 2018-10-09

## 2018-10-07 RX ORDER — FUROSEMIDE 40 MG
40 TABLET ORAL
Qty: 0 | Refills: 0 | Status: COMPLETED | OUTPATIENT
Start: 2018-10-07 | End: 2018-10-08

## 2018-10-07 RX ADMIN — FAMOTIDINE 20 MILLIGRAM(S): 10 INJECTION INTRAVENOUS at 21:39

## 2018-10-07 RX ADMIN — PIPERACILLIN AND TAZOBACTAM 25 GRAM(S): 4; .5 INJECTION, POWDER, LYOPHILIZED, FOR SOLUTION INTRAVENOUS at 05:31

## 2018-10-07 RX ADMIN — Medication 325 MILLIGRAM(S): at 14:20

## 2018-10-07 RX ADMIN — PIPERACILLIN AND TAZOBACTAM 25 GRAM(S): 4; .5 INJECTION, POWDER, LYOPHILIZED, FOR SOLUTION INTRAVENOUS at 21:39

## 2018-10-07 RX ADMIN — ATENOLOL 25 MILLIGRAM(S): 25 TABLET ORAL at 05:31

## 2018-10-07 RX ADMIN — Medication 3 MILLILITER(S): at 20:36

## 2018-10-07 RX ADMIN — SERTRALINE 25 MILLIGRAM(S): 25 TABLET, FILM COATED ORAL at 16:42

## 2018-10-07 RX ADMIN — ISOSORBIDE MONONITRATE 30 MILLIGRAM(S): 60 TABLET, EXTENDED RELEASE ORAL at 21:39

## 2018-10-07 RX ADMIN — CLOPIDOGREL BISULFATE 75 MILLIGRAM(S): 75 TABLET, FILM COATED ORAL at 14:21

## 2018-10-07 RX ADMIN — PIPERACILLIN AND TAZOBACTAM 25 GRAM(S): 4; .5 INJECTION, POWDER, LYOPHILIZED, FOR SOLUTION INTRAVENOUS at 14:21

## 2018-10-07 RX ADMIN — Medication 40 MILLIGRAM(S): at 16:43

## 2018-10-07 RX ADMIN — Medication 3 MILLILITER(S): at 14:10

## 2018-10-07 NOTE — PROGRESS NOTE ADULT - ASSESSMENT
89 yo female, PMHx HTN, HLD, CAD on ASA/Plavix, bed/wheelchair bound, admitted with acute altered mental status likely encephalopathy, suspect related to possible hypoxemic event secondary to choking / aspiration and severe sepsis with septic shock due to UTI. Patient is s/p TPA for concerns for acute CVA.     1: Severe sepsis from acute UTI: on antibx. blood c/s testing  2: Acute encephalopathy Resolved. likely sec to above  3: R/o CVA, S/p TPA. MRI pending. per Neuro OK to resume antiplatelets.   4: Acute hypoxic resp distress- resolved. Oxygen supplementation PRN. nebs PRN.  5: Resolved Lactic acidosis: likely sec to sepsis  6: bronchospasm, no clear past med h/o of pulm dz. Ct chest ordered STAT- GG opacities can be edema. will get ECHO STAT. and give dose of lasix and monitor response to same.   ID:   Need to follow up cx and sensitivity from Nursing home ( on Monday) to de-escalate from pipe-tazo     ICDs for total 48 hrs post TpA

## 2018-10-07 NOTE — PROGRESS NOTE ADULT - SUBJECTIVE AND OBJECTIVE BOX
HEALTH ISSUES - PROBLEM Dx:  89 yo female, PMHx HTN, HLD, CAD on ASA/Plavix, bed/wheelchair bound, being treated with Ciprofloxacin for UTI x 3 days at nursing facility, switched today to Rocephin. Patient was given an injection of Rocephin, and found unresponsive and cyanotic. (Family states she has a thyroid mass and does experience difficulty swallowing at times).    In  ED; BP 84/59, ABG revealed 7.23/58/61/20, lactate 4.4. Patient was fluid resuscitated in ED and BP recovered. Patient's mental status began to improve, but she remained confused and with slurred speech. A CT head was peformed which was negative for infarct or hemorrhage. A CODE STROKE was then called, NIH = 21, and TeleNeurology Dr. Allison advised to give TPA    In MICU: monitored post TpA. uneventful stay. likely presentation sec to sepsis sec to UTI. antibx initiated. D/G     INTERVAL HPI/ OVERNIGHT EVENTS:  pt comfortable in bed  appears breathing with mild effort  but she states she is not aware of this      REVIEW OF SYSTEMS:    CONSTITUTIONAL: No fever,  EYES: No eyedischarge  ENMT:   No sinus or throat pain  NECK: No pain or stiffness  RESPIRATORY: No cough, wheezing, hemoptysis; No shortness of breath  CARDIOVASCULAR: No chest pain, palpitations, dizziness, or leg swelling  GASTROINTESTINAL: No abdominal or epigastric pain. No nausea, vomiting, or hematemesis; No diarrhea or constipation. No melena or hematochezia.  GENITOURINARY: No dysuria, frequency, hematuria, or incontinence  NEUROLOGICAL: No headaches, memory loss, loss of strength, numbness, or tremors  SKIN: No itching, burning, rashes, or lesions   MUSCULOSKELETAL: No joint pain or swelling; No muscle, back, or extremity pain  PSYCHIATRIC: No depression, anxiety, mood swings, or difficulty sleeping      Vital Signs Last 24 Hrs  T(C): 36.7 (07 Oct 2018 07:52), Max: 37.2 (06 Oct 2018 15:35)  T(F): 98.1 (07 Oct 2018 07:52), Max: 98.9 (06 Oct 2018 15:35)  HR: 66 (07 Oct 2018 07:52) (66 - 72)  BP: 135/70 (07 Oct 2018 07:52) (115/63 - 135/70)  BP(mean): --  RR: 18 (07 Oct 2018 07:52) (18 - 20)  SpO2: 95% (07 Oct 2018 07:52) (93% - 98%)    PHYSICAL EXAM-  GENERAL: well-groomed, well-developed  HEAD:  Atraumatic, Normocephalic  EYES: EOMI, onjunctiva and sclera clear  ENMT:  Moist mucous membranes, o lesions  NECK: Supple, No JVD, Normal thyroid  NERVOUS SYSTEM:  Alert & Oriented X , No focal deficits; moving all extremitite   PULMONARY: CTA bruce; No rhonchi, rales    CARDIOVASCULAR: S1 S2 +, reg rate and rhythm; No murmurs, rubs, or gallops  ABDOMEN: Soft, Nontender, Nondistended; Bowel sounds present  EXTREMITIES:  2+ Peripheral Pulses, No clubbing, cyanosis, or edema  LYMPH: No lymphadenopathy noted  SKIN: No rashes or lesions    LABS:                        11.6   9.8   )-----------( 242      ( 06 Oct 2018 05:37 )             37.8     10-06    139  |  105  |  21.0<H>  ----------------------------<  86  4.8   |  22.0  |  1.11    Ca    8.7      06 Oct 2018 05:37  Phos  3.3     10-06  Mg     2.2     10-06    TPro  6.0<L>  /  Alb  3.3  /  TBili  0.7  /  DBili  x   /  AST  31  /  ALT  14  /  AlkPhos  63  10-06    PT/INR - ( 06 Oct 2018 05:37 )   PT: 12.8 sec;   INR: 1.16 ratio         Assessment and Plan HEALTH ISSUES - PROBLEM Dx:  89 yo female, PMHx HTN, HLD, CAD on ASA/Plavix, bed/wheelchair bound, being treated with Ciprofloxacin for UTI x 3 days at nursing facility, switched today to Rocephin. Patient was given an injection of Rocephin, and found unresponsive and cyanotic. (Family states she has a thyroid mass and does experience difficulty swallowing at times).    In  ED; BP 84/59, ABG revealed 7.23/58/61/20, lactate 4.4. Patient was fluid resuscitated in ED and BP recovered. Patient's mental status began to improve, but she remained confused and with slurred speech. A CT head was peformed which was negative for infarct or hemorrhage. A CODE STROKE was then called, NIH = 21, and TeleNeurology Dr. Allison advised to give TPA    In MICU: monitored post TpA. uneventful stay. likely presentation sec to sepsis sec to UTI. antibx initiated. D/G     INTERVAL HPI/ OVERNIGHT EVENTS:  pt comfortable in bed  appears breathing with mild effort  but she states she is not aware of this      REVIEW OF SYSTEMS:    CONSTITUTIONAL: No fever,  EYES: No eyedischarge  ENMT:   No sinus or throat pain  NECK: No pain or stiffness  RESPIRATORY: No cough, wheezing, hemoptysis; No shortness of breath  CARDIOVASCULAR: No chest pain, palpitations, dizziness, or leg swelling  GASTROINTESTINAL: No abdominal or epigastric pain. No nausea, vomiting, or hematemesis; No diarrhea or constipation. No melena or hematochezia.  GENITOURINARY: No dysuria, frequency, hematuria, or incontinence  NEUROLOGICAL: No headaches, memory loss, loss of strength, numbness, or tremors  SKIN: No itching, burning, rashes, or lesions   MUSCULOSKELETAL: No joint pain or swelling; No muscle, back, or extremity pain  PSYCHIATRIC: No depression, anxiety, mood swings, or difficulty sleeping      Vital Signs Last 24 Hrs  T(C): 36.7 (07 Oct 2018 07:52), Max: 37.2 (06 Oct 2018 15:35)  T(F): 98.1 (07 Oct 2018 07:52), Max: 98.9 (06 Oct 2018 15:35)  HR: 66 (07 Oct 2018 07:52) (66 - 72)  BP: 135/70 (07 Oct 2018 07:52) (115/63 - 135/70)  BP(mean): --  RR: 18 (07 Oct 2018 07:52) (18 - 20)  SpO2: 95% (07 Oct 2018 07:52) (93% - 98%)    PHYSICAL EXAM-  GENERAL: well-groomed, well-developed  HEAD:  Atraumatic, Normocephalic  EYES: EOMI, onjunctiva and sclera clear  ENMT:  Moist mucous membranes, o lesions  NECK: Supple, No JVD, Normal thyroid  NERVOUS SYSTEM:  Alert & Oriented X , No focal deficits; moving all extremitite   PULMONARY: Kermit rhonchi +; No rales    CARDIOVASCULAR: S1 S2 +, reg rate and rhythm; No murmurs, rubs, or gallops  ABDOMEN: Soft, Nontender, Nondistended; Bowel sounds present  EXTREMITIES:  2+ Peripheral Pulses, No clubbing, cyanosis, or edema  LYMPH: No lymphadenopathy noted  SKIN: No rashes or lesions    LABS:                        11.6   9.8   )-----------( 242      ( 06 Oct 2018 05:37 )             37.8     10-06    139  |  105  |  21.0<H>  ----------------------------<  86  4.8   |  22.0  |  1.11    Ca    8.7      06 Oct 2018 05:37  Phos  3.3     10-06  Mg     2.2     10-06    TPro  6.0<L>  /  Alb  3.3  /  TBili  0.7  /  DBili  x   /  AST  31  /  ALT  14  /  AlkPhos  63  10-06    PT/INR - ( 06 Oct 2018 05:37 )   PT: 12.8 sec;   INR: 1.16 ratio         Assessment and Plan

## 2018-10-07 NOTE — PROGRESS NOTE ADULT - SUBJECTIVE AND OBJECTIVE BOX
[facility]                                        Neurology at [place]                                 Jose Luis Ewing, & Lit                                  [address]                                        [place], [place], [address]                                             [phone]          CC: altered mental status     HISTORY:  The patient is a 89y Female who was given an injection of Rocephin for UTI, and found unresponsive 20 minutes later. Per patient's daughter, patient was found sitting up in her chair with her dinner tray unresponsive and cyanotic. Upon arrival to ED, patient had persistent cyanosis of distal extremities, BP 84/59, ABG revealed 7.23/58/61/20, lactate 4.4. Patient was fluid resuscitated in ED and BP recovered.   She remained somewhat confused and had slurred speech. Stroke code was called.   The patient was evaluated by the tele-medicine stroke service by "robot" and t-PA was administered.    Interim history:  Now transferred to [address] [place] [address].   No neurologic complaints.     ROS:   Denies headache or dizziness.  Denies chest pain.  Denies shortness of breath.    MEDICATIONS  (STANDING):  ATENolol  Tablet 25 milliGRAM(s) Oral daily  clopidogrel Tablet 75 milliGRAM(s) Oral daily  famotidine    Tablet 20 milliGRAM(s) Oral at bedtime  ferrous    sulfate 325 milliGRAM(s) Oral daily  isosorbide   mononitrate ER Tablet (IMDUR) 30 milliGRAM(s) Oral at bedtime  piperacillin/tazobactam IVPB. 3.375 Gram(s) IV Intermittent every 8 hours  sertraline 25 milliGRAM(s) Oral daily      Vital Signs Last 24 Hrs  T(C): 36.7 (07 Oct 2018 07:52), Max: 37.2 (06 Oct 2018 15:35)  T(F): 98.1 (07 Oct 2018 07:52), Max: 98.9 (06 Oct 2018 15:35)  HR: 66 (07 Oct 2018 07:52) (56 - 72)  BP: 135/70 (07 Oct 2018 07:52) (115/63 - 143/66)  BP(mean): 95 (06 Oct 2018 13:00) (85 - 95)  RR: 18 (07 Oct 2018 07:52) (18 - 39)  SpO2: 95% (07 Oct 2018 07:52) (93% - 99%)    Detailed Neurologic Exam:    Mental status: The patient is awake and alert. There is no aphasia. There is no dysarthria.     Cranial nerves: Pupils equal and react symmetrically to light. There is no visual field deficit to threat. Extraocular motion is full with no nystagmus. There is no ptosis. Facial sensation is intact. Facial musculature is symmetric. Palate elevates symmetrically. Tongue is midline.    Motor: There is normal bulk and tone.  There is no tremor.  Strength grossly 5/5 bilaterally.    Sensation: Grossly intact to light touch and pin.    Reflexes: 2+ throughout and plantar responses are flexor.    Cerebellar: No dysmetria on finger nose testing.    Labs:     10-06    139  |  105  |  21.0<H>  ----------------------------<  86  4.8   |  22.0  |  1.11    Ca    8.7      06 Oct 2018 05:37  Phos  3.3     10-06  Mg     2.2     10-06    TPro  6.0<L>  /  Alb  3.3  /  TBili  0.7  /  DBili  x   /  AST  31  /  ALT  14  /  AlkPhos  63  10-06                            11.6   9.8   )-----------( 242      ( 06 Oct 2018 05:37 )             37.8

## 2018-10-07 NOTE — PROGRESS NOTE ADULT - ASSESSMENT
89y Female with alteration of mental status in setting of shock.   Now resolved.     Altered mental status   Now resolved.   Does not sound like CVA.   No objection to MRI brain.   OK to continue antiplatelet.     UTI.   On antibiotics per medical team.     Hypertension.  Control blood pressure.

## 2018-10-08 PROBLEM — Z00.00 ENCOUNTER FOR PREVENTIVE HEALTH EXAMINATION: Status: ACTIVE | Noted: 2018-10-08

## 2018-10-08 LAB
ANION GAP SERPL CALC-SCNC: 14 MMOL/L — SIGNIFICANT CHANGE UP (ref 5–17)
BUN SERPL-MCNC: 16 MG/DL — SIGNIFICANT CHANGE UP (ref 8–20)
CALCIUM SERPL-MCNC: 9.1 MG/DL — SIGNIFICANT CHANGE UP (ref 8.6–10.2)
CHLORIDE SERPL-SCNC: 97 MMOL/L — LOW (ref 98–107)
CO2 SERPL-SCNC: 29 MMOL/L — SIGNIFICANT CHANGE UP (ref 22–29)
CREAT SERPL-MCNC: 1.1 MG/DL — SIGNIFICANT CHANGE UP (ref 0.5–1.3)
GLUCOSE SERPL-MCNC: 105 MG/DL — SIGNIFICANT CHANGE UP (ref 70–115)
POTASSIUM SERPL-MCNC: 4 MMOL/L — SIGNIFICANT CHANGE UP (ref 3.5–5.3)
POTASSIUM SERPL-SCNC: 4 MMOL/L — SIGNIFICANT CHANGE UP (ref 3.5–5.3)
SODIUM SERPL-SCNC: 140 MMOL/L — SIGNIFICANT CHANGE UP (ref 135–145)

## 2018-10-08 PROCEDURE — 99233 SBSQ HOSP IP/OBS HIGH 50: CPT

## 2018-10-08 PROCEDURE — 99232 SBSQ HOSP IP/OBS MODERATE 35: CPT

## 2018-10-08 RX ORDER — SACCHAROMYCES BOULARDII 250 MG
250 POWDER IN PACKET (EA) ORAL
Qty: 0 | Refills: 0 | Status: DISCONTINUED | OUTPATIENT
Start: 2018-10-08 | End: 2018-10-09

## 2018-10-08 RX ORDER — METHENAMINE MANDELATE 1 G
1 TABLET ORAL
Qty: 0 | Refills: 0 | COMMUNITY

## 2018-10-08 RX ORDER — FUROSEMIDE 40 MG
40 TABLET ORAL
Qty: 0 | Refills: 0 | Status: DISCONTINUED | OUTPATIENT
Start: 2018-10-08 | End: 2018-10-09

## 2018-10-08 RX ORDER — ENOXAPARIN SODIUM 100 MG/ML
40 INJECTION SUBCUTANEOUS EVERY 24 HOURS
Qty: 0 | Refills: 0 | Status: DISCONTINUED | OUTPATIENT
Start: 2018-10-08 | End: 2018-10-10

## 2018-10-08 RX ADMIN — Medication 3 MILLILITER(S): at 15:56

## 2018-10-08 RX ADMIN — PIPERACILLIN AND TAZOBACTAM 25 GRAM(S): 4; .5 INJECTION, POWDER, LYOPHILIZED, FOR SOLUTION INTRAVENOUS at 15:10

## 2018-10-08 RX ADMIN — Medication 325 MILLIGRAM(S): at 11:21

## 2018-10-08 RX ADMIN — PIPERACILLIN AND TAZOBACTAM 25 GRAM(S): 4; .5 INJECTION, POWDER, LYOPHILIZED, FOR SOLUTION INTRAVENOUS at 22:48

## 2018-10-08 RX ADMIN — Medication 40 MILLIGRAM(S): at 17:07

## 2018-10-08 RX ADMIN — Medication 3 MILLILITER(S): at 20:38

## 2018-10-08 RX ADMIN — PIPERACILLIN AND TAZOBACTAM 25 GRAM(S): 4; .5 INJECTION, POWDER, LYOPHILIZED, FOR SOLUTION INTRAVENOUS at 05:34

## 2018-10-08 RX ADMIN — ENOXAPARIN SODIUM 40 MILLIGRAM(S): 100 INJECTION SUBCUTANEOUS at 17:07

## 2018-10-08 RX ADMIN — CLOPIDOGREL BISULFATE 75 MILLIGRAM(S): 75 TABLET, FILM COATED ORAL at 11:21

## 2018-10-08 RX ADMIN — ATENOLOL 25 MILLIGRAM(S): 25 TABLET ORAL at 05:35

## 2018-10-08 RX ADMIN — SERTRALINE 25 MILLIGRAM(S): 25 TABLET, FILM COATED ORAL at 11:21

## 2018-10-08 RX ADMIN — Medication 40 MILLIGRAM(S): at 05:34

## 2018-10-08 RX ADMIN — Medication 3 MILLILITER(S): at 08:52

## 2018-10-08 RX ADMIN — Medication 250 MILLIGRAM(S): at 17:07

## 2018-10-08 NOTE — PROGRESS NOTE ADULT - ASSESSMENT
89 yo female, PMHx HTN, HLD, CAD on ASA/Plavix, bed/wheelchair bound, admitted with acute altered mental status likely encephalopathy, suspect related to possible hypoxemic event secondary to choking / aspiration and severe sepsis with septic shock due to UTI. Patient is s/p TPA for concerns for acute CVA.     1: Severe sepsis from acute UTI: on antibx. blood c/s and urine c/s negative. called michael do to get urine c/s results. but impossible to get any results. has finished 5 days of iv antibx. and since c/s negative. will d/c all antibx today. and monitor  2: Acute encephalopathy Resolved. likely sec to above. per according to son she is still "not sharp" as before.   3: R/o CVA, S/p TPA. MRI pending. per Neuro OK to resume antiplatelets.   4: Acute hypoxic resp distress- resolved. Oxygen supplementation PRN. nebs PRN.  5: Resolved Lactic acidosis: likely sec to sepsis  6: bronchospasm, no clear past med h/o of pulm dz. Ct chest ordered STAT- GG opacities can be edema. eCHo with mild pulm HTN. cont lasix iv for 24- 48 hrs. add solumedrol and watch for response. will get pulm consult if no response to above. will need outpt pulmonary to r/o underlying lung dz.    lovenox

## 2018-10-08 NOTE — PROGRESS NOTE ADULT - SUBJECTIVE AND OBJECTIVE BOX
[facility]                                                                     NEUROLOGY AT [place]                                                                       Alonzo Weiner, Jose Luis                                                                      [address]                                                                           [place], [place], [address]                                                                                 [phone]                                                                           Neurology Progress Note        No new complaints.    Awake and alert, fully oriented  Speech, comprehension intact  Pupils =, reactive  Full visual fields and extraocular movements  Face symmetric.  Good strength bilaterally  No drift    Neuro status stable    Brain MRI pending

## 2018-10-08 NOTE — PROGRESS NOTE ADULT - SUBJECTIVE AND OBJECTIVE BOX
HEALTH ISSUES - PROBLEM Dx:  89 yo female, PMHx HTN, HLD, CAD on ASA/Plavix, bed/wheelchair bound, being treated with Ciprofloxacin for UTI x 3 days at nursing facility, switched today to Rocephin. Patient was given an injection of Rocephin, and found unresponsive and cyanotic. (Family states she has a thyroid mass and does experience difficulty swallowing at times).    In  ED; BP 84/59, ABG revealed 7.23/58/61/20, lactate 4.4. Patient was fluid resuscitated in ED and BP recovered. Patient's mental status began to improve, but she remained confused and with slurred speech. A CT head was performed which was negative for infarct or hemorrhage. A CODE STROKE was then called, NIH = 21, and Tele Neurology Dr. Allison advised to give TPA    In MICU: monitored post TpA. uneventful stay. likely presentation sec to sepsis sec to UTI. antibx initiated. D/G     INTERVAL HPI/ OVERNIGHT EVENTS:  comfortable lying in bed  per her son- no history of asthma, and no h/o chf    REVIEW OF SYSTEMS:    CONSTITUTIONAL: No fever,  EYES: No eyedischarge  ENMT:   No sinus or throat pain  NECK: No pain or stiffness  RESPIRATORY: No cough, hemoptysis; No shortness of breath; wheezing +  CARDIOVASCULAR: No chest pain, palpitations, dizziness, or leg swelling  GASTROINTESTINAL: No abdominal or epigastric pain. No nausea, vomiting, or hematemesis; No diarrhea or constipation. No melena or hematochezia.  GENITOURINARY: No dysuria, frequency, hematuria, or incontinence  NEUROLOGICAL: No headaches, memory loss, loss of strength, numbness, or tremors  SKIN: No itching, burning, rashes, or lesions   MUSCULOSKELETAL: No joint pain or swelling; No muscle, back, or extremity pain  PSYCHIATRIC: No depression, anxiety      Vital Signs Last 24 Hrs  T(C): 36.9 (08 Oct 2018 13:00), Max: 37.1 (07 Oct 2018 23:20)  T(F): 98.4 (08 Oct 2018 13:00), Max: 98.8 (07 Oct 2018 23:20)  HR: 65 (08 Oct 2018 15:59) (58 - 96)  BP: 116/66 (08 Oct 2018 13:00) (116/66 - 164/79)  BP(mean): --  RR: 18 (08 Oct 2018 13:00) (18 - 18)  SpO2: 95% (08 Oct 2018 15:59) (95% - 97%)    PHYSICAL EXAM-  GENERAL: NAD, well-groomed, well-developed  HEAD:  Atraumatic, Normocephalic  EYES: EOMI, PERRLA, conjunctiva and sclera clear  ENMT: No tonsillar erythema, exudates, or enlargement; Moist mucous membranes, Good dentition, No lesions  NECK: Supple, No JVD, Normal thyroid  NERVOUS SYSTEM:  Alert & Oriented X3, Motor Strength 5/5 B/L upper and lower extremities; DTRs 2+ intact and symmetric  CHEST/LUNG: Clear to percussion bilaterally; No rales, rhonchi, wheezing, or rubs  HEART: Regular rate and rhythm; No murmurs, rubs, or gallops  ABDOMEN: Soft, Nontender, Nondistended; Bowel sounds present  EXTREMITIES:  2+ Peripheral Pulses, No clubbing, cyanosis, or edema  LYMPH: No lymphadenopathy noted  SKIN: No rashes or lesions    LABS:    10-08    140  |  97<L>  |  16.0  ----------------------------<  105  4.0   |  29.0  |  1.10    Ca    9.1      08 Oct 2018 07:32    Assessment and Plan

## 2018-10-09 LAB
ANION GAP SERPL CALC-SCNC: 18 MMOL/L — HIGH (ref 5–17)
BUN SERPL-MCNC: 20 MG/DL — SIGNIFICANT CHANGE UP (ref 8–20)
CALCIUM SERPL-MCNC: 9.3 MG/DL — SIGNIFICANT CHANGE UP (ref 8.6–10.2)
CHLORIDE SERPL-SCNC: 94 MMOL/L — LOW (ref 98–107)
CO2 SERPL-SCNC: 29 MMOL/L — SIGNIFICANT CHANGE UP (ref 22–29)
CREAT SERPL-MCNC: 1.38 MG/DL — HIGH (ref 0.5–1.3)
CULTURE RESULTS: SIGNIFICANT CHANGE UP
CULTURE RESULTS: SIGNIFICANT CHANGE UP
GLUCOSE SERPL-MCNC: 192 MG/DL — HIGH (ref 70–115)
HCT VFR BLD CALC: 44.6 % — SIGNIFICANT CHANGE UP (ref 37–47)
HGB BLD-MCNC: 13.9 G/DL — SIGNIFICANT CHANGE UP (ref 12–16)
MCHC RBC-ENTMCNC: 29.5 PG — SIGNIFICANT CHANGE UP (ref 27–31)
MCHC RBC-ENTMCNC: 31.2 G/DL — LOW (ref 32–36)
MCV RBC AUTO: 94.7 FL — SIGNIFICANT CHANGE UP (ref 81–99)
PLATELET # BLD AUTO: 275 K/UL — SIGNIFICANT CHANGE UP (ref 150–400)
POTASSIUM SERPL-MCNC: 3.6 MMOL/L — SIGNIFICANT CHANGE UP (ref 3.5–5.3)
POTASSIUM SERPL-SCNC: 3.6 MMOL/L — SIGNIFICANT CHANGE UP (ref 3.5–5.3)
RAPID RVP RESULT: SIGNIFICANT CHANGE UP
RBC # BLD: 4.71 M/UL — SIGNIFICANT CHANGE UP (ref 4.4–5.2)
RBC # FLD: 13.5 % — SIGNIFICANT CHANGE UP (ref 11–15.6)
SODIUM SERPL-SCNC: 141 MMOL/L — SIGNIFICANT CHANGE UP (ref 135–145)
SPECIMEN SOURCE: SIGNIFICANT CHANGE UP
SPECIMEN SOURCE: SIGNIFICANT CHANGE UP
WBC # BLD: 6.7 K/UL — SIGNIFICANT CHANGE UP (ref 4.8–10.8)
WBC # FLD AUTO: 6.7 K/UL — SIGNIFICANT CHANGE UP (ref 4.8–10.8)

## 2018-10-09 PROCEDURE — 99233 SBSQ HOSP IP/OBS HIGH 50: CPT

## 2018-10-09 PROCEDURE — 99222 1ST HOSP IP/OBS MODERATE 55: CPT

## 2018-10-09 PROCEDURE — 70551 MRI BRAIN STEM W/O DYE: CPT | Mod: 26

## 2018-10-09 RX ORDER — IPRATROPIUM/ALBUTEROL SULFATE 18-103MCG
3 AEROSOL WITH ADAPTER (GRAM) INHALATION EVERY 6 HOURS
Qty: 0 | Refills: 0 | Status: DISCONTINUED | OUTPATIENT
Start: 2018-10-09 | End: 2018-10-10

## 2018-10-09 RX ADMIN — Medication 40 MILLIGRAM(S): at 06:12

## 2018-10-09 RX ADMIN — Medication 3 MILLILITER(S): at 08:25

## 2018-10-09 RX ADMIN — Medication 40 MILLIGRAM(S): at 00:44

## 2018-10-09 RX ADMIN — ISOSORBIDE MONONITRATE 30 MILLIGRAM(S): 60 TABLET, EXTENDED RELEASE ORAL at 00:44

## 2018-10-09 RX ADMIN — FAMOTIDINE 20 MILLIGRAM(S): 10 INJECTION INTRAVENOUS at 21:30

## 2018-10-09 RX ADMIN — ISOSORBIDE MONONITRATE 30 MILLIGRAM(S): 60 TABLET, EXTENDED RELEASE ORAL at 21:30

## 2018-10-09 RX ADMIN — Medication 40 MILLIGRAM(S): at 17:34

## 2018-10-09 RX ADMIN — ENOXAPARIN SODIUM 40 MILLIGRAM(S): 100 INJECTION SUBCUTANEOUS at 17:34

## 2018-10-09 RX ADMIN — Medication 3 MILLILITER(S): at 03:37

## 2018-10-09 RX ADMIN — Medication 250 MILLIGRAM(S): at 06:13

## 2018-10-09 RX ADMIN — ATENOLOL 25 MILLIGRAM(S): 25 TABLET ORAL at 06:13

## 2018-10-09 RX ADMIN — FAMOTIDINE 20 MILLIGRAM(S): 10 INJECTION INTRAVENOUS at 00:44

## 2018-10-09 RX ADMIN — CLOPIDOGREL BISULFATE 75 MILLIGRAM(S): 75 TABLET, FILM COATED ORAL at 12:01

## 2018-10-09 RX ADMIN — SERTRALINE 25 MILLIGRAM(S): 25 TABLET, FILM COATED ORAL at 12:01

## 2018-10-09 RX ADMIN — Medication 325 MILLIGRAM(S): at 12:01

## 2018-10-09 NOTE — PROGRESS NOTE ADULT - ASSESSMENT
89y Female with alteration of mental status in setting of shock.   Now resolved.     Altered mental status   Now resolved.   MRI negative for CVA.  No objection to MRI brain.   OK to continue antiplatelet.     UTI.   On antibiotics per medical team.     Hypertension.  Control blood pressure.    No further specific neurologic recommendations. Will be available as needed.     Case discussed with Dr Santiago.

## 2018-10-09 NOTE — PHYSICAL THERAPY INITIAL EVALUATION ADULT - ACTIVE RANGE OF MOTION EXAMINATION, REHAB EVAL
except knee flex due to c/o decreased flexibility/bilateral  lower extremity Active ROM was WFL (within functional limits)/bilateral upper extremity Active ROM was WFL (within functional limits)

## 2018-10-09 NOTE — CONSULT NOTE ADULT - SUBJECTIVE AND OBJECTIVE BOX
PULMONARY CONSULT NOTE      RAYSA DEGROOTAYAAN-[id number]    Patient is a 89y old  Female who presents with a chief complaint of r/o Acute CVA (09 Oct 2018 09:54)  89 yo female, PMHx HTN, HLD, CAD on ASA/Plavix, bed/wheelchair bound, being treated with Ciprofloxacin for UTI x 3 days at nursing facility, switched today to Rocephin. Patient was given an injection of Rocephin, and found unresponsive and cyanotic. (Family states she has a thyroid mass and does experience difficulty swallowing at times).  Asked to see patient for infiltrates noted on CT chest    INTERVAL HPI/OVERNIGHT EVENTS:    MEDICATIONS  (STANDING):  ALBUTerol/ipratropium for Nebulization 3 milliLiter(s) Nebulizer every 6 hours  ATENolol  Tablet 25 milliGRAM(s) Oral daily  clopidogrel Tablet 75 milliGRAM(s) Oral daily  enoxaparin Injectable 40 milliGRAM(s) SubCutaneous every 24 hours  famotidine    Tablet 20 milliGRAM(s) Oral at bedtime  ferrous    sulfate 325 milliGRAM(s) Oral daily  isosorbide   mononitrate ER Tablet (IMDUR) 30 milliGRAM(s) Oral at bedtime  methylPREDNISolone sodium succinate Injectable 40 milliGRAM(s) IV Push every 12 hours  sertraline 25 milliGRAM(s) Oral daily      MEDICATIONS  (PRN):  bisacodyl Suppository 10 milliGRAM(s) Rectal daily PRN Constipation  calcium carbonate    500 mG (Tums) Chewable 1 Tablet(s) Chew every 6 hours PRN Dyspepsia      Allergies    erythromycin (Unknown)  latex (Unknown)    Intolerances        PAST MEDICAL & SURGICAL HISTORY:  Anemia  CKD (chronic kidney disease)  Meningioma  Coronary artery disease  Hyperlipemia  Essential hypertension  History of resection of meningioma      FAMILY HISTORY:      SOCIAL HISTORY  Smoking History:     REVIEW OF SYSTEMS:    CONSTITUTIONAL:  As per HPI.    HEENT:  Eyes:  No diplopia or blurred vision. ENT:  No earache, sore throat or runny nose.    CARDIOVASCULAR:  No pressure, squeezing, tightness, heaviness or aching about the chest; no palpitations.    RESPIRATORY:  No cough, shortness of breath, PND or orthopnea. Mild SOBOE    GASTROINTESTINAL:  No nausea, vomiting or diarrhea.    GENITOURINARY:  No dysuria, frequency or urgency.    MUSCULOSKELETAL:  No joint pains    SKIN:  No new lesions.    NEUROLOGIC:  No paresthesias, fasciculations, seizures or weakness.    PSYCHIATRIC:  No disorder of thought or mood.    ENDOCRINE:  No heat or cold intolerance, polyuria or polydipsia.    HEMATOLOGICAL:  No easy bruising or bleeding.     Vital Signs Last 24 Hrs  T(C): 36.8 (09 Oct 2018 07:00), Max: 36.9 (08 Oct 2018 13:00)  T(F): 98.2 (09 Oct 2018 07:00), Max: 98.5 (09 Oct 2018 01:06)  HR: 64 (09 Oct 2018 08:42) (64 - 96)  BP: 120/70 (09 Oct 2018 07:00) (90/50 - 125/68)  BP(mean): --  RR: 18 (09 Oct 2018 07:00) (18 - 19)  SpO2: 95% (09 Oct 2018 03:46) (95% - 98%)    PHYSICAL EXAMINATION:    GENERAL: The patient is a well-developed, well-nourished _lady____in no apparent distress.     HEENT: Head is normocephalic and atraumatic. Extraocular muscles are intact. Mucous membranes are moist.     NECK: Supple.     LUNGS: Clear to auscultation without wheezing, rales, or rhonchi. Respirations unlabored    HEART: Regular rate and rhythm without murmur.    ABDOMEN: Soft, nontender, and nondistended.  No hepatosplenomegaly is noted.    EXTREMITIES: Without any cyanosis, clubbing, rash, lesions or edema.    NEUROLOGIC: Grossly intact.    SKIN: No ulceration or induration present.      LABS:                        13.9   6.7   )-----------( 275      ( 09 Oct 2018 11:15 )             44.6     10-08    140  |  97<L>  |  16.0  ----------------------------<  105  4.0   |  29.0  |  1.10    Ca    9.1      08 Oct 2018 07:32                  Serum Pro-Brain Natriuretic Peptide: 3017 pg/mL (10-07-18 @ 14:24)          MICROBIOLOGY:    RADIOLOGY & ADDITIONAL STUDIES:< from: CT Chest No Cont (10.07.18 @ 12:17) >    Lungs and airways: Nonspecific groundglass is noted bilaterally. This may   be seen in the setting of pulmonary edema or air trapping. There is   atelectasis at the lung base on the left. There is biapical parenchymal   scarring.    Pleura: Within normal limits. No pleural effusion.    Mediastinum and Shelly: No abnormally enlarged lymph nodes.     Heart: Normal size. No pericardial effusion.     Vessels: Coronary artery calcifications and atherosclerotic   calcifications within the thoracic aorta.    Chest wall and lower neck: There is a 3.0 cm left lobe thyroid nodule.    Upper abdomen: Gallstones.    Bones: Within normal limits.    IMPRESSION: Bilateral nonspecific groundglass opacities which could be   related to pulmonary edema or air trapping.    Left lobe thyroid nodule. Correlation with thyroid sonography recommended   when clinically feasible.    < end of copied text >

## 2018-10-09 NOTE — PHYSICAL THERAPY INITIAL EVALUATION ADULT - ADDITIONAL COMMENTS
pt is questionable historian. first told PT she lived in an apartment in her son's house with no steps and uses a RW to get around. nurse states pt lives at [facility] and is bed to/wheelchair mobility. will need to be confirmed.

## 2018-10-09 NOTE — PROGRESS NOTE ADULT - SUBJECTIVE AND OBJECTIVE BOX
HEALTH ISSUES - PROBLEM Dx:  89 yo female, PMHx HTN, HLD, CAD on ASA/Plavix, bed/wheelchair bound, being treated with Ciprofloxacin for UTI x 3 days at nursing facility, switched today to Rocephin. Patient was given an injection of Rocephin, and found unresponsive and cyanotic. (Family states she has a thyroid mass and does experience difficulty swallowing at times).    In  ED; BP 84/59, ABG revealed 7.23/58/61/20, lactate 4.4. Patient was fluid resuscitated in ED and BP recovered. Patient's mental status began to improve, but she remained confused and with slurred speech. A CT head was performed which was negative for infarct or hemorrhage. A CODE STROKE was then called, NIH = 21, and Tele Neurology Dr. Allison advised to give TPA    In MICU: monitored post TpA. uneventful stay. likely presentation sec to sepsis sec to UTI. antibx initiated. D/G     INTERVAL HPI/ OVERNIGHT EVENTS:  comfortable lying in bed  per her son- no history of asthma, and no h/o chf    REVIEW OF SYSTEMS:    CONSTITUTIONAL: No fever,  EYES: No eyedischarge  ENMT:   No sinus or throat pain  NECK: No pain or stiffness  RESPIRATORY: No cough, hemoptysis; No shortness of breath; wheezing +  CARDIOVASCULAR: No chest pain, palpitations, dizziness, or leg swelling  GASTROINTESTINAL: No abdominal or epigastric pain. No nausea, vomiting, or hematemesis; No diarrhea or constipation. No melena or hematochezia.  GENITOURINARY: No dysuria, frequency, hematuria, or incontinence  NEUROLOGICAL: No headaches, memory loss, loss of strength, numbness, or tremors  SKIN: No itching, burning, rashes, or lesions   MUSCULOSKELETAL: No joint pain or swelling; No muscle, back, or extremity pain  PSYCHIATRIC: No depression, anxiety    Vital Signs Last 24 Hrs  T(C): 36.8 (09 Oct 2018 07:00), Max: 36.9 (09 Oct 2018 01:06)  T(F): 98.2 (09 Oct 2018 07:00), Max: 98.5 (09 Oct 2018 01:06)  HR: 64 (09 Oct 2018 08:42) (64 - 83)  BP: 120/70 (09 Oct 2018 07:00) (90/50 - 125/68)  BP(mean): --  RR: 18 (09 Oct 2018 07:00) (18 - 19)  SpO2: 95% (09 Oct 2018 08:07) (95% - 98%)    PHYSICAL EXAM-  GENERAL: NAD, well-groomed, well-developed  HEAD:  Atraumatic, Normocephalic  EYES: EOMI, PERRLA, conjunctiva and sclera clear  ENMT: No tonsillar erythema, exudates, or enlargement; Moist mucous membranes, Good dentition, No lesions  NECK: Supple, No JVD, Normal thyroid  NERVOUS SYSTEM:  Alert & Oriented X3, Motor Strength 5/5 B/L upper and lower extremities; DTRs 2+ intact and symmetric  CHEST/LUNG: Clear to percussion bilaterally; No rales, rhonchi, wheezing, or rubs  HEART: Regular rate and rhythm; No murmurs, rubs, or gallops  ABDOMEN: Soft, Nontender, Nondistended; Bowel sounds present  EXTREMITIES:  2+ Peripheral Pulses, No clubbing, cyanosis, or edema  LYMPH: No lymphadenopathy noted  SKIN: No rashes or lesions    LABS:                        13.9   6.7   )-----------( 275      ( 09 Oct 2018 11:15 )             44.6     10-09    141  |  94<L>  |  20.0  ----------------------------<  192<H>  3.6   |  29.0  |  1.38<H>    Ca    9.3      09 Oct 2018 11:15    Assessment and Plan

## 2018-10-09 NOTE — PHYSICAL THERAPY INITIAL EVALUATION ADULT - PERTINENT HX OF CURRENT PROBLEM, REHAB EVAL
89 yo female, PMHx HTN, HLD, CAD on ASA/Plavix, bed/wheelchair bound, being treated with Ciprofloxacin for UTI x 3 days at nursing facility, switched today to Rocephin. Patient was given an injection of Rocephin, and found unresponsive 20 minutes later. Per patient's daughter, patient was found sitting up in her chair with her dinner tray unresponsive and cyanotic. (Family states she has a thyroid mass and does experience difficulty swallowing at times).

## 2018-10-09 NOTE — PHYSICAL THERAPY INITIAL EVALUATION ADULT - PASSIVE RANGE OF MOTION EXAMINATION, REHAB EVAL
bilateral upper extremity Passive ROM was WFL (within functional limits)/bilateral lower extremity Passive ROM was WFL (within functional limits)/except knee flex due to c/o decreased flexibility

## 2018-10-09 NOTE — CONSULT NOTE ADULT - ASSESSMENT
89 yo lady with UTI, then ? sepsis, and possible CVA, given TPA    No hx pulmonary disease, wheezing last night    CT chest showed increased markings consistent with some fluid overload  PE  shows she is awake, not dyspneic , on RA  No wheezing, no dyspnea/    Doubt any significant pulmonary disease tho she may have been in mild CHF after her treatment for infection  Do not suggest any further pulmon intervention

## 2018-10-09 NOTE — PROGRESS NOTE ADULT - ASSESSMENT
89 yo female, PMHx HTN, HLD, CAD on ASA/Plavix, bed/wheelchair bound, admitted with acute altered mental status likely encephalopathy, suspect related to possible hypoxemic event secondary to choking / aspiration and severe sepsis with septic shock due to UTI. Patient is s/p TPA for concerns for acute CVA.     1: Severe sepsis from acute UTI: on antibx. blood c/s and urine c/s negative. called michael do to get urine c/s results. but impossible to get any results. has finished 5 days of iv antibx. and since c/s negative. d/c all antibx 10/8/18. and no fever or any other infectious process noted  2: Acute encephalopathy Resolved. likely sec to above. per according to son she is still "not sharp" as before.   3: R/o CVA, S/p TPA. MRI negative. per Neuro OK to resume antiplatelets.   4: Acute hypoxic resp distress- resolved. Oxygen supplementation PRN. nebs PRN.   5: Resolved Lactic acidosis: likely sec to sepsis  6: bronchospasm, no clear past med h/o of pulm dz. Ct chest ordered STAT- GG opacities can be edema. eCHo with mild pulm HTN. good results with lasix iv for 24- 48 hrs. likely sec to fluid resuscitation for sepsis. add solumedrol and watch for response. pulm consult appreciated. quick taper of steroids. PT eval pending.     lovenox    possible d/c in 1-2 days.

## 2018-10-09 NOTE — PROGRESS NOTE ADULT - SUBJECTIVE AND OBJECTIVE BOX
[facility]                                        Ne[facility]                                 [facility]                                  [address]                                        [place], [place], [address]                                             [phone]        CC: altered mental status     HISTORY:  The patient is a 89y Female who was given an injection of Rocephin for UTI, and found unresponsive 20 minutes later. Per patient's daughter, patient was found sitting up in her chair with her dinner tray unresponsive and cyanotic. Upon arrival to ED, patient had persistent cyanosis of distal extremities, BP 84/59, ABG revealed 7.23/58/61/20, lactate 4.4. Patient was fluid resuscitated in ED and BP recovered.   She remained somewhat confused and had slurred speech. Stroke code was called.   The patient was evaluated by the tele-medicine stroke service by "robot" and t-PA was administered.    Interim history:  Now transferred to [address] [place] [address].   No neurologic complaints.     ROS:   Denies headache or dizziness.  Denies chest pain.  Denies shortness of breath.    MEDICATIONS  (STANDING):  ALBUTerol/ipratropium for Nebulization 3 milliLiter(s) Nebulizer every 6 hours  ATENolol  Tablet 25 milliGRAM(s) Oral daily  clopidogrel Tablet 75 milliGRAM(s) Oral daily  enoxaparin Injectable 40 milliGRAM(s) SubCutaneous every 24 hours  famotidine    Tablet 20 milliGRAM(s) Oral at bedtime  ferrous    sulfate 325 milliGRAM(s) Oral daily  furosemide   Injectable 40 milliGRAM(s) IV Push two times a day  isosorbide   mononitrate ER Tablet (IMDUR) 30 milliGRAM(s) Oral at bedtime  methylPREDNISolone sodium succinate Injectable 40 milliGRAM(s) IV Push every 8 hours  sertraline 25 milliGRAM(s) Oral daily      Vital Signs Last 24 Hrs  T(C): 36.8 (09 Oct 2018 07:00), Max: 36.9 (08 Oct 2018 13:00)  T(F): 98.2 (09 Oct 2018 07:00), Max: 98.5 (09 Oct 2018 01:06)  HR: 64 (09 Oct 2018 08:42) (64 - 96)  BP: 120/70 (09 Oct 2018 07:00) (90/50 - 125/68)  RR: 18 (09 Oct 2018 07:00) (18 - 19)  SpO2: 95% (09 Oct 2018 03:46) (95% - 98%)    Detailed Neurologic Exam:    Mental status: The patient is awake and alert. There is no aphasia. There is no dysarthria.     Cranial nerves: Pupils equal and react symmetrically to light. There is no visual field deficit to threat. Extraocular motion is full with no nystagmus. There is no ptosis. Facial sensation is intact. Facial musculature is symmetric. Palate elevates symmetrically. Tongue is midline.    Motor: There is normal bulk and tone.  There is no tremor.  Strength grossly 5/5 bilaterally.    Sensation: Grossly intact to light touch and pin.    Reflexes: 2+ throughout and plantar responses are flexor.    Cerebellar: No dysmetria on finger nose testing.    Labs:     10-08    140  |  97<L>  |  16.0  ----------------------------<  105  4.0   |  29.0  |  1.10    Ca    9.1      08 Oct 2018 07:32    Rad:   MRI brain images reviewed (and concur with report): There is no acute pathology.   There are chronic postoperative and ischemic changes.

## 2018-10-09 NOTE — PHYSICAL THERAPY INITIAL EVALUATION ADULT - CRITERIA FOR SKILLED THERAPEUTIC INTERVENTIONS
impairments found/therapy frequency/anticipated equipment needs at discharge/functional limitations in following categories/anticipated discharge recommendation/risk reduction/prevention/rehab potential

## 2018-10-10 ENCOUNTER — TRANSCRIPTION ENCOUNTER (OUTPATIENT)
Age: 83
End: 2018-10-10

## 2018-10-10 VITALS
DIASTOLIC BLOOD PRESSURE: 75 MMHG | TEMPERATURE: 98 F | SYSTOLIC BLOOD PRESSURE: 150 MMHG | HEART RATE: 68 BPM | OXYGEN SATURATION: 98 % | RESPIRATION RATE: 18 BRPM

## 2018-10-10 PROCEDURE — 82330 ASSAY OF CALCIUM: CPT

## 2018-10-10 PROCEDURE — 84295 ASSAY OF SERUM SODIUM: CPT

## 2018-10-10 PROCEDURE — 82962 GLUCOSE BLOOD TEST: CPT

## 2018-10-10 PROCEDURE — 87798 DETECT AGENT NOS DNA AMP: CPT

## 2018-10-10 PROCEDURE — 99292 CRITICAL CARE ADDL 30 MIN: CPT | Mod: 25

## 2018-10-10 PROCEDURE — 93306 TTE W/DOPPLER COMPLETE: CPT

## 2018-10-10 PROCEDURE — 80048 BASIC METABOLIC PNL TOTAL CA: CPT

## 2018-10-10 PROCEDURE — 83605 ASSAY OF LACTIC ACID: CPT

## 2018-10-10 PROCEDURE — 84484 ASSAY OF TROPONIN QUANT: CPT

## 2018-10-10 PROCEDURE — 37195 THROMBOLYTIC THERAPY STROKE: CPT

## 2018-10-10 PROCEDURE — 96374 THER/PROPH/DIAG INJ IV PUSH: CPT | Mod: XU

## 2018-10-10 PROCEDURE — 97163 PT EVAL HIGH COMPLEX 45 MIN: CPT

## 2018-10-10 PROCEDURE — 97530 THERAPEUTIC ACTIVITIES: CPT

## 2018-10-10 PROCEDURE — 85730 THROMBOPLASTIN TIME PARTIAL: CPT

## 2018-10-10 PROCEDURE — 81001 URINALYSIS AUTO W/SCOPE: CPT

## 2018-10-10 PROCEDURE — 84100 ASSAY OF PHOSPHORUS: CPT

## 2018-10-10 PROCEDURE — 87086 URINE CULTURE/COLONY COUNT: CPT

## 2018-10-10 PROCEDURE — 87640 STAPH A DNA AMP PROBE: CPT

## 2018-10-10 PROCEDURE — 71045 X-RAY EXAM CHEST 1 VIEW: CPT

## 2018-10-10 PROCEDURE — 83880 ASSAY OF NATRIURETIC PEPTIDE: CPT

## 2018-10-10 PROCEDURE — 36415 COLL VENOUS BLD VENIPUNCTURE: CPT

## 2018-10-10 PROCEDURE — 96375 TX/PRO/DX INJ NEW DRUG ADDON: CPT | Mod: XU

## 2018-10-10 PROCEDURE — 87040 BLOOD CULTURE FOR BACTERIA: CPT

## 2018-10-10 PROCEDURE — 71250 CT THORAX DX C-: CPT

## 2018-10-10 PROCEDURE — 83690 ASSAY OF LIPASE: CPT

## 2018-10-10 PROCEDURE — 82803 BLOOD GASES ANY COMBINATION: CPT

## 2018-10-10 PROCEDURE — 70551 MRI BRAIN STEM W/O DYE: CPT

## 2018-10-10 PROCEDURE — 80053 COMPREHEN METABOLIC PANEL: CPT

## 2018-10-10 PROCEDURE — 85027 COMPLETE CBC AUTOMATED: CPT

## 2018-10-10 PROCEDURE — 85610 PROTHROMBIN TIME: CPT

## 2018-10-10 PROCEDURE — 99291 CRITICAL CARE FIRST HOUR: CPT | Mod: 25

## 2018-10-10 PROCEDURE — 84132 ASSAY OF SERUM POTASSIUM: CPT

## 2018-10-10 PROCEDURE — 82435 ASSAY OF BLOOD CHLORIDE: CPT

## 2018-10-10 PROCEDURE — 82947 ASSAY GLUCOSE BLOOD QUANT: CPT

## 2018-10-10 PROCEDURE — 87641 MR-STAPH DNA AMP PROBE: CPT

## 2018-10-10 PROCEDURE — 87633 RESP VIRUS 12-25 TARGETS: CPT

## 2018-10-10 PROCEDURE — 70450 CT HEAD/BRAIN W/O DYE: CPT

## 2018-10-10 PROCEDURE — 87581 M.PNEUMON DNA AMP PROBE: CPT

## 2018-10-10 PROCEDURE — 83735 ASSAY OF MAGNESIUM: CPT

## 2018-10-10 PROCEDURE — 97116 GAIT TRAINING THERAPY: CPT

## 2018-10-10 PROCEDURE — 85014 HEMATOCRIT: CPT

## 2018-10-10 PROCEDURE — 87486 CHLMYD PNEUM DNA AMP PROBE: CPT

## 2018-10-10 PROCEDURE — 93005 ELECTROCARDIOGRAM TRACING: CPT

## 2018-10-10 PROCEDURE — 94640 AIRWAY INHALATION TREATMENT: CPT

## 2018-10-10 PROCEDURE — 82550 ASSAY OF CK (CPK): CPT

## 2018-10-10 PROCEDURE — 99239 HOSP IP/OBS DSCHRG MGMT >30: CPT

## 2018-10-10 RX ORDER — CLOPIDOGREL BISULFATE 75 MG/1
1 TABLET, FILM COATED ORAL
Qty: 0 | Refills: 0 | COMMUNITY

## 2018-10-10 RX ORDER — METHENAMINE MANDELATE 1 G
1 TABLET ORAL
Qty: 0 | Refills: 0 | COMMUNITY

## 2018-10-10 RX ORDER — IPRATROPIUM/ALBUTEROL SULFATE 18-103MCG
3 AEROSOL WITH ADAPTER (GRAM) INHALATION
Qty: 0 | Refills: 0 | COMMUNITY
Start: 2018-10-10

## 2018-10-10 RX ORDER — SENNA PLUS 8.6 MG/1
2 TABLET ORAL
Qty: 0 | Refills: 0 | COMMUNITY

## 2018-10-10 RX ORDER — ISOSORBIDE MONONITRATE 60 MG/1
1 TABLET, EXTENDED RELEASE ORAL
Qty: 0 | Refills: 0 | COMMUNITY

## 2018-10-10 RX ORDER — FERROUS SULFATE 325(65) MG
1 TABLET ORAL
Qty: 0 | Refills: 0 | COMMUNITY

## 2018-10-10 RX ORDER — RANITIDINE HYDROCHLORIDE 150 MG/1
1 TABLET, FILM COATED ORAL
Qty: 0 | Refills: 0 | COMMUNITY

## 2018-10-10 RX ORDER — ACETAMINOPHEN 500 MG
2 TABLET ORAL
Qty: 0 | Refills: 0 | COMMUNITY

## 2018-10-10 RX ORDER — DOCUSATE SODIUM 100 MG
1 CAPSULE ORAL
Qty: 0 | Refills: 0 | COMMUNITY

## 2018-10-10 RX ORDER — SERTRALINE 25 MG/1
1 TABLET, FILM COATED ORAL
Qty: 0 | Refills: 0 | COMMUNITY

## 2018-10-10 RX ORDER — ATENOLOL 25 MG/1
1 TABLET ORAL
Qty: 0 | Refills: 0 | COMMUNITY

## 2018-10-10 RX ORDER — MAGNESIUM HYDROXIDE 400 MG/1
30 TABLET, CHEWABLE ORAL
Qty: 0 | Refills: 0 | COMMUNITY

## 2018-10-10 RX ADMIN — Medication 325 MILLIGRAM(S): at 13:03

## 2018-10-10 RX ADMIN — SERTRALINE 25 MILLIGRAM(S): 25 TABLET, FILM COATED ORAL at 13:03

## 2018-10-10 RX ADMIN — CLOPIDOGREL BISULFATE 75 MILLIGRAM(S): 75 TABLET, FILM COATED ORAL at 13:03

## 2018-10-10 RX ADMIN — ATENOLOL 25 MILLIGRAM(S): 25 TABLET ORAL at 06:50

## 2018-10-10 RX ADMIN — Medication 40 MILLIGRAM(S): at 06:50

## 2018-10-10 NOTE — PROGRESS NOTE ADULT - REASON FOR ADMISSION
r/o Acute CVA

## 2018-10-10 NOTE — DISCHARGE NOTE ADULT - HOSPITAL COURSE
89 yo female, PMHx HTN, HLD, CAD on ASA/Plavix, bed/wheelchair bound, being treated with Ciprofloxacin for UTI x 3 days at nursing facility, switched today to Rocephin. Patient was given an injection of Rocephin, and found unresponsive and cyanotic. (Family states she has a thyroid mass and does experience difficulty swallowing at times).  In  ED; BP 84/59, ABG revealed 7.23/58/61/20, lactate 4.4. Patient was fluid resuscitated in ED and BP recovered. Patient's mental status began to improve, but she remained confused and with slurred speech. A CT head was performed which was negative for infarct or hemorrhage. A CODE STROKE was then called, NIH = 21, and Tele Neurology Dr. Allison advised to give TPA  In MICU: monitored post TpA. uneventful stay. likely presentation sec to sepsis sec to UTI. antibx initiated. D/G to floor.  on the Floor: noted wheezing without hypoxia. ECHO showed nl EF without mention of diastolic fxn. mild pulm HTN. CT showed GG opacities likely vasc edema. She responded well to IV diuresis. Short course of steroids was also started and due to family history of asthma, Pulm consulted. Per pulm no suspicion of underlying pulm dz no further needed. Both diuretics and steroids stopped. Likely etiology was sec to pulm edema sec to fluid resuscitation for septic shock. ANd both blood c/s and urine c/s negative.     Medically stable and agreeable with discharge and follow up plan. Patient/ Family was advised to return to ED if any symptoms occur or worsen.    Time 38 mins

## 2018-10-10 NOTE — DISCHARGE NOTE ADULT - MEDICATION SUMMARY - MEDICATIONS TO TAKE
I will START or STAY ON the medications listed below when I get home from the hospital:    acetaminophen 325 mg oral tablet  -- 2 tab(s) by mouth every 6 hours, As Needed  -- Indication: For pain/ fever    Milk of Magnesia 8% oral suspension  -- 30 milliliter(s) by mouth once a day (at bedtime), As Needed  -- Indication: For dydpepsia    Imdur 30 mg oral tablet, extended release  -- 1 tab(s) by mouth once a day (in the evening)  -- Indication: For Coronary artery disease    sertraline 25 mg oral tablet  -- 1 tab(s) by mouth once a day  -- Indication: For depression    clopidogrel 75 mg oral tablet  -- 1 tab(s) by mouth once a day  -- Indication: For Coronary artery disease    atenolol 25 mg oral tablet  -- 1 tab(s) by mouth once a day  -- Indication: For Coronary artery disease    ipratropium-albuterol 0.5 mg-2.5 mg/3 mLinhalation solution  -- 3 milliliter(s) inhaled every 6 hours, As needed, Shortness of Breath and/or Wheezing  -- Indication: For respiratory symptoms    raNITIdine 150 mg oral tablet  -- 1 tab(s) by mouth once a day (at bedtime)  -- Indication: For gerd    ferrous sulfate 325 mg (65 mg elemental iron) oral tablet  -- 1 tab(s) by mouth once a day  -- Indication: For Anemia    Fleet Enema 7 g-19 g rectal enema  -- 133 milliliter(s) rectally once, As Needed  -- Indication: For bowel regimen    bisacodyl 10 mg rectal suppository  -- 1 suppository(ies) rectally once a day, As Needed  -- Indication: For bowel regimen    Colace 100 mg oral capsule  -- 1 cap(s) by mouth 3 times a day  -- Indication: For bowel regimen    Senna 8.6 mg oral tablet  -- 2 tab(s) by mouth once a day (at bedtime)  -- Indication: For bowel regimen    Hiprex 1 g oral tablet  -- 1 tab(s) by mouth 2 times a day  -- Indication: For Suppressive therapy

## 2018-10-10 NOTE — DISCHARGE NOTE ADULT - PATIENT PORTAL LINK FT
You can access the [facility] Portal, offered by [facility], by registering with the following website: [email or web address]

## 2018-10-10 NOTE — PROGRESS NOTE ADULT - ASSESSMENT
89 yo female, PMHx HTN, HLD, CAD on ASA/Plavix, bed/wheelchair bound, admitted with acute altered mental status likely encephalopathy, suspect related to possible hypoxemic event secondary to choking / aspiration and severe sepsis with septic shock due to UTI. Patient is s/p TPA for concerns for acute CVA.     1: Severe sepsis from acute UTI: on antibx. blood c/s and urine c/s negative. called michael do to get urine c/s results. but impossible to get any results. has finished 5 days of iv antibx. and since c/s negative. d/c all antibx 10/8/18. and no fever or any other infectious process noted  2: Acute encephalopathy Resolved. likely sec to above. per according to son she is still "not sharp" as before.   3: R/o CVA, S/p TPA. MRI negative. per Neuro OK to resume antiplatelets.   4: Acute hypoxic resp distress- resolved. Oxygen supplementation PRN. nebs PRN.   5: Resolved Lactic acidosis: likely sec to sepsis  6: bronchospasm, no clear past med h/o of pulm dz. Ct chest ordered STAT- GG opacities can be edema. eCHo with mild pulm HTN. good results with lasix iv for 24- 48 hrs. likely sec to fluid resuscitation for sepsis. add solumedrol and watch for response. pulm consult appreciated. quick taper of steroids. PT eval pending.     lovenox    per PT return back to SNF. D/C today

## 2018-10-10 NOTE — DISCHARGE NOTE ADULT - CARE PLAN
Principal Discharge DX:	Encephalopathy acute  Goal:	resolved  Assessment and plan of treatment:	Follow with PMD in < 1 week.  Secondary Diagnosis:	Essential hypertension  Secondary Diagnosis:	Hypoxemia  Secondary Diagnosis:	Lactic acidosis  Secondary Diagnosis:	Septic shock  Secondary Diagnosis:	UTI (urinary tract infection)  Secondary Diagnosis:	Coronary artery disease

## 2018-10-10 NOTE — DISCHARGE NOTE ADULT - SECONDARY DIAGNOSIS.
Essential hypertension Hypoxemia Lactic acidosis Septic shock UTI (urinary tract infection) Coronary artery disease

## 2018-10-10 NOTE — DISCHARGE NOTE ADULT - CARE PROVIDER_API CALL
Sanju Wallace (DO), Family Medicine  [address]  [place] [address]  Phone: [phone]  Fax: [phone]

## 2018-10-10 NOTE — PROGRESS NOTE ADULT - SUBJECTIVE AND OBJECTIVE BOX
HEALTH ISSUES - PROBLEM Dx:  89 yo female, PMHx HTN, HLD, CAD on ASA/Plavix, bed/wheelchair bound, being treated with Ciprofloxacin for UTI x 3 days at nursing facility, switched today to Rocephin. Patient was given an injection of Rocephin, and found unresponsive and cyanotic. (Family states she has a thyroid mass and does experience difficulty swallowing at times).    In  ED; BP 84/59, ABG revealed 7.23/58/61/20, lactate 4.4. Patient was fluid resuscitated in ED and BP recovered. Patient's mental status began to improve, but she remained confused and with slurred speech. A CT head was performed which was negative for infarct or hemorrhage. A CODE STROKE was then called, NIH = 21, and Tele Neurology Dr. Allison advised to give TPA    In MICU: monitored post TpA. uneventful stay. likely presentation sec to sepsis sec to UTI. antibx initiated. D/G   INTERVAL HPI/ OVERNIGHT EVENTS:  no further wheezing  stable and comfortable, no complaints    REVIEW OF SYSTEMS:    CONSTITUTIONAL: No fever,  EYES: No eyedischarge  ENMT:   No sinus or throat pain  NECK: No pain or stiffness  RESPIRATORY: No cough, hemoptysis; No shortness of breath; wheezing +  CARDIOVASCULAR: No chest pain, palpitations, dizziness, or leg swelling  GASTROINTESTINAL: No abdominal or epigastric pain. No nausea, vomiting, or hematemesis; No diarrhea or constipation. No melena or hematochezia.  GENITOURINARY: No dysuria, frequency, hematuria, or incontinence  NEUROLOGICAL: No headaches, memory loss, loss of strength, numbness, or tremors  SKIN: No itching, burning, rashes, or lesions   MUSCULOSKELETAL: No joint pain or swelling; No muscle, back, or extremity pain  PSYCHIATRIC: No depression, anxiety    Vital Signs Last 24 Hrs  T(C): 37 (10 Oct 2018 00:09), Max: 37 (10 Oct 2018 00:09)  T(F): 98.6 (10 Oct 2018 00:09), Max: 98.6 (10 Oct 2018 00:09)  HR: 77 (10 Oct 2018 00:09) (64 - 77)  BP: 139/73 (10 Oct 2018 00:09) (137/70 - 139/73)  BP(mean): --  RR: 19 (10 Oct 2018 00:09) (18 - 19)  SpO2: 96% (09 Oct 2018 17:22) (96% - 96%)    PHYSICAL EXAM-  GENERAL: NAD, well-groomed, well-developed  HEAD:  Atraumatic, Normocephalic  EYES: EOMI, PERRLA, conjunctiva and sclera clear  ENMT: No tonsillar erythema, exudates, or enlargement; Moist mucous membranes, Good dentition, No lesions  NECK: Supple, No JVD, Normal thyroid  NERVOUS SYSTEM:  Alert & Oriented X3, Motor Strength 5/5 B/L upper and lower extremities; DTRs 2+ intact and symmetric  CHEST/LUNG: Clear to percussion bilaterally; No rales, rhonchi, wheezing, or rubs  HEART: Regular rate and rhythm; No murmurs, rubs, or gallops  ABDOMEN: Soft, Nontender, Nondistended; Bowel sounds present  EXTREMITIES:  2+ Peripheral Pulses, No clubbing, cyanosis, or edema  LYMPH: No lymphadenopathy noted  SKIN: No rashes or lesions    LABS:                        13.9   6.7   )-----------( 275      ( 09 Oct 2018 11:15 )             44.6     10-09    141  |  94<L>  |  20.0  ----------------------------<  192<H>  3.6   |  29.0  |  1.38<H>    Ca    9.3      09 Oct 2018 11:15    Assessment and Plan

## 2018-10-22 PROBLEM — D64.9 ANEMIA, UNSPECIFIED: Chronic | Status: ACTIVE | Noted: 2018-10-05

## 2018-10-22 PROBLEM — N18.9 CHRONIC KIDNEY DISEASE, UNSPECIFIED: Chronic | Status: ACTIVE | Noted: 2018-10-05

## 2018-10-22 PROBLEM — I10 ESSENTIAL (PRIMARY) HYPERTENSION: Chronic | Status: ACTIVE | Noted: 2018-10-05

## 2018-10-22 PROBLEM — E78.5 HYPERLIPIDEMIA, UNSPECIFIED: Chronic | Status: ACTIVE | Noted: 2018-10-05

## 2018-10-22 PROBLEM — D32.9 BENIGN NEOPLASM OF MENINGES, UNSPECIFIED: Chronic | Status: ACTIVE | Noted: 2018-10-05

## 2018-10-22 PROBLEM — I25.10 ATHEROSCLEROTIC HEART DISEASE OF NATIVE CORONARY ARTERY WITHOUT ANGINA PECTORIS: Chronic | Status: ACTIVE | Noted: 2018-10-05

## 2018-10-24 ENCOUNTER — APPOINTMENT (OUTPATIENT)
Dept: NEUROLOGY | Facility: CLINIC | Age: 83
End: 2018-10-24
Payer: MEDICARE

## 2018-10-24 DIAGNOSIS — Z86.79 PERSONAL HISTORY OF OTHER DISEASES OF THE CIRCULATORY SYSTEM: ICD-10-CM

## 2018-10-24 DIAGNOSIS — Z86.2 PERSONAL HISTORY OF DISEASES OF THE BLOOD AND BLOOD-FORMING ORGANS AND CERTAIN DISORDERS INVOLVING THE IMMUNE MECHANISM: ICD-10-CM

## 2018-10-24 DIAGNOSIS — Z86.018 PERSONAL HISTORY OF OTHER BENIGN NEOPLASM: ICD-10-CM

## 2018-10-24 PROCEDURE — 99214 OFFICE O/P EST MOD 30 MIN: CPT

## 2018-11-24 NOTE — PATIENT PROFILE ADULT. - ANESTHESIA, PREVIOUS REACTION, PROFILE
Message  FMLA forms completed and faxed to [phone]. Confirmation received and patient notified.      Signatures   Electronically signed by : Sarai Pedersen CMA; Jun 21 2017  1:01PM CST    
nausea/vomiting

## 2019-04-16 NOTE — PATIENT PROFILE ADULT. - PROVIDER NOTIFICATION
HPI     Patient is here for annual eye exam.  Pt complains of blurry vision central and occasionally foggy vision OU  (-)Flashes (-)Floaters  (-)Itch, (-)tear, (-)burn, (--)Dryness. (-) OTC Drops   (-)Photophobia  (-)Glare (--)diplopia (-) headaches          Last edited by Rolf Elizabeth, OD on 4/16/2019  1:15 PM. (History)            Assessment /Plan     For exam results, see Encounter Report.    Nuclear sclerosis, bilateral  -Educated patient on presence of cataracts at today's exam, monitor at annual dilated fundus exam. 8+ years surgical estimate.    Glaucoma screening  -Monitor with annual eye exam and IOP check    Myopia of both eyes with regular astigmatism and presbyopia  Eyeglass Final Rx     Eyeglass Final Rx       Sphere Cylinder Axis Dist VA Add    Right -1.25 +0.75 030 20/20 +2.50    Left -1.50 +1.00 165 20/20 +2.50    Type:  PAL    Expiration Date:  4/16/2020                  RTC 1 yr                  Declines

## 2019-04-24 ENCOUNTER — APPOINTMENT (OUTPATIENT)
Dept: NEUROLOGY | Facility: CLINIC | Age: 84
End: 2019-04-24
Payer: MEDICARE

## 2019-04-24 VITALS
HEIGHT: 64 IN | SYSTOLIC BLOOD PRESSURE: 130 MMHG | DIASTOLIC BLOOD PRESSURE: 80 MMHG | BODY MASS INDEX: 25.27 KG/M2 | WEIGHT: 148 LBS

## 2019-04-24 DIAGNOSIS — R41.82 ALTERED MENTAL STATUS, UNSPECIFIED: ICD-10-CM

## 2019-04-24 PROCEDURE — 99213 OFFICE O/P EST LOW 20 MIN: CPT

## 2019-04-24 RX ORDER — ISOSORBIDE MONONITRATE 30 MG/1
30 TABLET, EXTENDED RELEASE ORAL
Qty: 30 | Refills: 0 | Status: ACTIVE | COMMUNITY
Start: 2018-10-10

## 2019-04-24 RX ORDER — CEFTRIAXONE 1 G/1
1 INJECTION, POWDER, FOR SOLUTION INTRAMUSCULAR; INTRAVENOUS
Qty: 7 | Refills: 0 | Status: COMPLETED | COMMUNITY
Start: 2019-03-20

## 2019-04-24 RX ORDER — MAGNESIUM HYDROXIDE 400 MG/5ML
400 SUSPENSION ORAL
Refills: 0 | Status: ACTIVE | COMMUNITY

## 2019-04-24 RX ORDER — ONDANSETRON HYDROCHLORIDE 4 MG/1
4 TABLET, FILM COATED ORAL
Refills: 0 | Status: COMPLETED | COMMUNITY

## 2019-04-24 RX ORDER — METOCLOPRAMIDE 5 MG/1
5 TABLET ORAL
Qty: 30 | Refills: 0 | Status: COMPLETED | COMMUNITY
Start: 2018-10-22

## 2019-04-24 RX ORDER — PREDNISONE 50 MG/1
50 TABLET ORAL
Refills: 0 | Status: COMPLETED | COMMUNITY

## 2019-04-24 RX ORDER — RANITIDINE 150 MG/1
150 TABLET ORAL
Qty: 60 | Refills: 0 | Status: COMPLETED | COMMUNITY
Start: 2018-10-17

## 2019-04-24 RX ORDER — ACETAMINOPHEN 325 MG/1
325 TABLET, FILM COATED ORAL
Refills: 0 | Status: ACTIVE | COMMUNITY

## 2019-04-24 RX ORDER — CAMPHOR 0.45 %
25 GEL (GRAM) TOPICAL
Refills: 0 | Status: COMPLETED | COMMUNITY

## 2019-04-24 RX ORDER — CEFPODOXIME PROXETIL 200 MG/1
200 TABLET, FILM COATED ORAL
Qty: 22 | Refills: 0 | Status: COMPLETED | COMMUNITY
Start: 2019-01-30

## 2019-04-24 RX ORDER — CEPHALEXIN 500 MG/1
500 CAPSULE ORAL
Qty: 21 | Refills: 0 | Status: COMPLETED | COMMUNITY
Start: 2019-02-20

## 2019-04-24 RX ORDER — LIDOCAINE HYDROCHLORIDE 10 MG/ML
1 INJECTION, SOLUTION INFILTRATION; PERINEURAL
Qty: 20 | Refills: 0 | Status: COMPLETED | COMMUNITY
Start: 2019-03-20

## 2019-04-24 RX ORDER — ATENOLOL 25 MG/1
25 TABLET ORAL
Refills: 0 | Status: ACTIVE | COMMUNITY

## 2019-04-24 RX ORDER — METHENAMINE HIPPURATE 1 G/1
1 TABLET ORAL
Refills: 0 | Status: ACTIVE | COMMUNITY

## 2019-04-24 RX ORDER — RANITIDINE HYDROCHLORIDE 150 MG/1
150 CAPSULE ORAL
Refills: 0 | Status: ACTIVE | COMMUNITY

## 2019-04-24 RX ORDER — CHLORHEXIDINE GLUCONATE 4 %
325 (65 FE) LIQUID (ML) TOPICAL
Refills: 0 | Status: ACTIVE | COMMUNITY

## 2019-04-24 RX ORDER — CEFPODOXIME PROXETIL 100 MG/1
100 TABLET, FILM COATED ORAL
Qty: 14 | Refills: 0 | Status: COMPLETED | COMMUNITY
Start: 2018-12-02

## 2019-04-24 RX ORDER — ENEMA 19; 7 G/133ML; G/133ML
7-19 ENEMA RECTAL
Refills: 0 | Status: ACTIVE | COMMUNITY

## 2019-04-24 RX ORDER — CLOPIDOGREL 75 MG/1
75 TABLET, FILM COATED ORAL
Refills: 0 | Status: ACTIVE | COMMUNITY

## 2019-04-24 RX ORDER — IPRATROPIUM BROMIDE AND ALBUTEROL SULFATE 2.5; .5 MG/3ML; MG/3ML
0.5-2.5 (3) SOLUTION RESPIRATORY (INHALATION)
Refills: 0 | Status: ACTIVE | COMMUNITY

## 2019-04-24 RX ORDER — ALBUTEROL SULFATE 2.5 MG/3ML
(2.5 MG/3ML) SOLUTION RESPIRATORY (INHALATION)
Refills: 0 | Status: ACTIVE | COMMUNITY

## 2019-04-24 NOTE — REASON FOR VISIT
[Follow-Up: _____] : a [unfilled] follow-up visit [Other: _____] : [unfilled] [FreeTextEntry1] : CC: altered mental status

## 2019-04-24 NOTE — PHYSICAL EXAM
[General Appearance - Alert] : alert [General Appearance - In No Acute Distress] : in no acute distress [Oriented To Time, Place, And Person] : oriented to person, place, and time [Memory Remote] : remote memory was not impaired [Cranial Nerves Optic (II)] : visual acuity intact bilaterally,  visual fields full to confrontation, pupils equal round and reactive to light [Cranial Nerves Oculomotor (III)] : extraocular motion intact [Cranial Nerves Trigeminal (V)] : facial sensation intact symmetrically [Cranial Nerves Facial (VII)] : face symmetrical [Cranial Nerves Vestibulocochlear (VIII)] : hearing was intact bilaterally [Cranial Nerves Glossopharyngeal (IX)] : tongue and palate midline [Cranial Nerves Accessory (XI - Cranial And Spinal)] : head turning and shoulder shrug symmetric [Cranial Nerves Hypoglossal (XII)] : there was no tongue deviation with protrusion [Sensation Tactile Decrease] : light touch was intact [Sensation Pain / Temperature Decrease] : pain and temperature was intact [Motor Tone] : muscle tone was normal in all four extremities [Sensation Vibration Decrease] : vibration was intact [1+] : Patella left 1+ [Edema] : there was no peripheral edema [Optic Disc Abnormality] : the optic disc were normal in size and color [Involuntary Movements] : no involuntary movements were seen [FreeTextEntry1] : Recent memory is poor. [Dysarthria] : no dysarthria [Aphasia] : no dysphasia/aphasia [Romberg's Sign] : Romberg's sign was negtive [Coordination - Dysmetria Impaired Finger-to-Nose Bilateral] : not present [Plantar Reflex Left Only] : normal on the left [Plantar Reflex Right Only] : normal on the right [FreeTextEntry8] : The patient presented in a wheelchair. She is unable to ambulate without assistance.

## 2019-04-24 NOTE — ASSESSMENT
[FreeTextEntry1] : This is a 89 year-old woman with a prior history of meningioma resection.\par \par She has had recurrent urinary tract infections.\par \par She was found to have some atrophy and ventriculomegaly on MRI.\par Having seen her in the hospital, it was not our impression that she had a seizure.\par She has had no further such episodes.\par Should she have any further episodes of unresponsiveness in the absence of infection and further workup can be pursued with EEG.\par \par I would see her back in 6 months for further reevaluation.\par I will see her sooner if needed.

## 2019-04-24 NOTE — CONSULT LETTER
[Dear  ___] : Dear ~KEIRA, [Courtesy Letter:] : I had the pleasure of seeing your patient, [unfilled], in my office today. [Consult Closing:] : Thank you very much for allowing me to participate in the care of this patient.  If you have any questions, please do not hesitate to contact me. [Sincerely,] : Sincerely, [Please see my note below.] : Please see my note below. [DrJay  ___] : Dr. PAULINO [FreeTextEntry3] : Prasanna Rosado MD.

## 2019-04-24 NOTE — HISTORY OF PRESENT ILLNESS
[FreeTextEntry1] : I saw this patient in the office today.\par \par She had been seen originally on 10/6/18 at [facility].\par She had presented with an acute confusional state in the setting of urinary tract infection.\par She was given t-PA in the emergency room as their first concern was possible stroke.\par Workup was negative for this.\par \par She is now at [facility].\par She has persisting periods of confusion.\par \par

## 2019-04-24 NOTE — DATA REVIEWED
[de-identified] : Brain MRI was performed on 10/9/18.\par The study demonstrated evidence of left temporal surgery with resultant encephalomalacia and postoperative change.\par There was also chronic ischemic change. There was atrophy and ventriculomegaly.\par

## 2019-07-23 ENCOUNTER — APPOINTMENT (OUTPATIENT)
Dept: ORTHOPEDIC SURGERY | Facility: CLINIC | Age: 84
End: 2019-07-23
Payer: MEDICARE

## 2019-07-23 DIAGNOSIS — M79.605 PAIN IN LEFT LEG: ICD-10-CM

## 2019-07-23 DIAGNOSIS — S82.142A DISPLACED BICONDYLAR FRACTURE OF LEFT TIBIA, INITIAL ENCOUNTER FOR CLOSED FRACTURE: ICD-10-CM

## 2019-07-23 PROCEDURE — 99204 OFFICE O/P NEW MOD 45 MIN: CPT | Mod: 57

## 2019-07-23 PROCEDURE — 27530 TREAT KNEE FRACTURE: CPT | Mod: LT

## 2019-07-23 NOTE — PHYSICAL EXAM
[de-identified] : Physical Exam:\par General: Well appearing, no acute distress, A&O\par Neurologic: A&Ox3, No focal deficits\par Head: NCAT without abrasions, lacerations, or ecchymosis to head, face, or scalp\par Respiratory: Equal chest wall expansion bilaterally, no accessory muscle use\par Lymphatic: No lymphadenopathy palpated\par Skin: Warm and dry\par Psychiatric: Normal mood and affect\par \par Left knee exam\par \par Skin: Clean, dry, intact\par Inspection: No obvious malalignment, no masses, mild swelling, minimal effusion\par Pulses: 2+ DP/PT pulses\par ROM: LEFT 0-45 degrees of flexion. Positive pain with deep knee flexion. \par Tenderness: No MJLT. Positive LJLT. No pain over the patella facets. No pain to the quadriceps mechanism. \par Stability: Stable to varus, valgus, lachman testing. Negative anterior/posterior drawer.\par Strength: 4/5 Q/H/TA/GS/EHL, no atrophy\par Neuro: In tact to light touch throughout, [de-identified] : MRI from  radiology was reviewed. There is a nondisplaced impaction-type injury to the left tibial plateau.

## 2019-07-23 NOTE — DISCUSSION/SUMMARY
[de-identified] : 89-year-old female with left tibial plateau fracture. As the fracture is nondisplaced only seen on MRI, I would allow her to weight-bear as tolerated with assistance from either a walker, crutches or a crane. No required. \par \par The fracture should heal well without intervention. If she is still symptomatic in a month, I would have her come back for repeat x-rays. Otherwise she may followup p.r.n. \par \par The patient was given the opportunity to ask questions and all questions were answered to their satisfaction.\par \par Lisandro Alvarez MD\par Orthopaedic Trauma Surgeon\par [facility]\par [facility]\par \par \par \par

## 2019-07-23 NOTE — HISTORY OF PRESENT ILLNESS
[de-identified] : The patient is a pleasant 89-year-old female who presents today for evaluation of left knee pain. She suffered a fall and an MRI was ordered by the medical director of her rehabilitation at [facility]. She comes in today for evaluation. She currently has been nonweightbearing at the rehabilitation. Currently deirdre lift due to the weightbearing restrictions. The patient states the pain is made worse with activity and relieved with rest. aching, 3/10 [Lifting] : worsened by lifting [Bending] : worsened by bending [Recumbency] : relieved by recumbency [Rest] : relieved by rest

## 2019-10-13 ENCOUNTER — EMERGENCY (EMERGENCY)
Facility: HOSPITAL | Age: 84
LOS: 1 days | Discharge: HOME | End: 2019-10-13
Attending: STUDENT IN AN ORGANIZED HEALTH CARE EDUCATION/TRAINING PROGRAM
Payer: MEDICARE

## 2019-10-13 VITALS
RESPIRATION RATE: 18 BRPM | HEART RATE: 90 BPM | OXYGEN SATURATION: 98 % | DIASTOLIC BLOOD PRESSURE: 73 MMHG | TEMPERATURE: 97 F | SYSTOLIC BLOOD PRESSURE: 185 MMHG

## 2019-10-13 DIAGNOSIS — Z98.890 OTHER SPECIFIED POSTPROCEDURAL STATES: Chronic | ICD-10-CM

## 2019-10-13 LAB
ALBUMIN SERPL ELPH-MCNC: 4.3 G/DL — SIGNIFICANT CHANGE UP (ref 3.3–5.2)
ALP SERPL-CCNC: 97 U/L — SIGNIFICANT CHANGE UP (ref 40–120)
ALT FLD-CCNC: 8 U/L — SIGNIFICANT CHANGE UP
ANION GAP SERPL CALC-SCNC: 12 MMOL/L — SIGNIFICANT CHANGE UP (ref 5–17)
APTT BLD: 30.3 SEC — SIGNIFICANT CHANGE UP (ref 27.5–36.3)
AST SERPL-CCNC: 14 U/L — SIGNIFICANT CHANGE UP
BASOPHILS # BLD AUTO: 0.06 K/UL — SIGNIFICANT CHANGE UP (ref 0–0.2)
BASOPHILS NFR BLD AUTO: 0.8 % — SIGNIFICANT CHANGE UP (ref 0–2)
BILIRUB SERPL-MCNC: 0.3 MG/DL — LOW (ref 0.4–2)
BUN SERPL-MCNC: 26 MG/DL — HIGH (ref 8–20)
CALCIUM SERPL-MCNC: 9.2 MG/DL — SIGNIFICANT CHANGE UP (ref 8.6–10.2)
CHLORIDE SERPL-SCNC: 105 MMOL/L — SIGNIFICANT CHANGE UP (ref 98–107)
CO2 SERPL-SCNC: 26 MMOL/L — SIGNIFICANT CHANGE UP (ref 22–29)
CREAT SERPL-MCNC: 1.13 MG/DL — SIGNIFICANT CHANGE UP (ref 0.5–1.3)
EOSINOPHIL # BLD AUTO: 0.22 K/UL — SIGNIFICANT CHANGE UP (ref 0–0.5)
EOSINOPHIL NFR BLD AUTO: 2.9 % — SIGNIFICANT CHANGE UP (ref 0–6)
GLUCOSE SERPL-MCNC: 99 MG/DL — SIGNIFICANT CHANGE UP (ref 70–115)
HCT VFR BLD CALC: 42.2 % — SIGNIFICANT CHANGE UP (ref 34.5–45)
HGB BLD-MCNC: 13.4 G/DL — SIGNIFICANT CHANGE UP (ref 11.5–15.5)
IMM GRANULOCYTES NFR BLD AUTO: 0.3 % — SIGNIFICANT CHANGE UP (ref 0–1.5)
INR BLD: 0.96 RATIO — SIGNIFICANT CHANGE UP (ref 0.88–1.16)
LYMPHOCYTES # BLD AUTO: 1.6 K/UL — SIGNIFICANT CHANGE UP (ref 1–3.3)
LYMPHOCYTES # BLD AUTO: 20.8 % — SIGNIFICANT CHANGE UP (ref 13–44)
MCHC RBC-ENTMCNC: 30.5 PG — SIGNIFICANT CHANGE UP (ref 27–34)
MCHC RBC-ENTMCNC: 31.8 GM/DL — LOW (ref 32–36)
MCV RBC AUTO: 96.1 FL — SIGNIFICANT CHANGE UP (ref 80–100)
MONOCYTES # BLD AUTO: 0.62 K/UL — SIGNIFICANT CHANGE UP (ref 0–0.9)
MONOCYTES NFR BLD AUTO: 8.1 % — SIGNIFICANT CHANGE UP (ref 2–14)
NEUTROPHILS # BLD AUTO: 5.16 K/UL — SIGNIFICANT CHANGE UP (ref 1.8–7.4)
NEUTROPHILS NFR BLD AUTO: 67.1 % — SIGNIFICANT CHANGE UP (ref 43–77)
PLATELET # BLD AUTO: 273 K/UL — SIGNIFICANT CHANGE UP (ref 150–400)
POTASSIUM SERPL-MCNC: 5 MMOL/L — SIGNIFICANT CHANGE UP (ref 3.5–5.3)
POTASSIUM SERPL-SCNC: 5 MMOL/L — SIGNIFICANT CHANGE UP (ref 3.5–5.3)
PROT SERPL-MCNC: 7.4 G/DL — SIGNIFICANT CHANGE UP (ref 6.6–8.7)
PROTHROM AB SERPL-ACNC: 11 SEC — SIGNIFICANT CHANGE UP (ref 10–12.9)
RBC # BLD: 4.39 M/UL — SIGNIFICANT CHANGE UP (ref 3.8–5.2)
RBC # FLD: 13.7 % — SIGNIFICANT CHANGE UP (ref 10.3–14.5)
SODIUM SERPL-SCNC: 143 MMOL/L — SIGNIFICANT CHANGE UP (ref 135–145)
TROPONIN T SERPL-MCNC: <0.01 NG/ML — SIGNIFICANT CHANGE UP (ref 0–0.06)
WBC # BLD: 7.68 K/UL — SIGNIFICANT CHANGE UP (ref 3.8–10.5)
WBC # FLD AUTO: 7.68 K/UL — SIGNIFICANT CHANGE UP (ref 3.8–10.5)

## 2019-10-13 PROCEDURE — 99284 EMERGENCY DEPT VISIT MOD MDM: CPT

## 2019-10-13 PROCEDURE — 70450 CT HEAD/BRAIN W/O DYE: CPT | Mod: 26

## 2019-10-13 PROCEDURE — 71045 X-RAY EXAM CHEST 1 VIEW: CPT | Mod: 26

## 2019-10-13 NOTE — ED ADULT NURSE REASSESSMENT NOTE - NS ED NURSE REASSESS COMMENT FT1
Pt received at 1947. Chart as noted. Pt is A&OX3. Pt denies pain, N/V/D. Pt VSS. Pt in NAD at this time. Pt moves all extremities equal and bilateral. Plan of care and wait time explained. Pt awaiting CT and labs.  Safety maintained.

## 2019-10-13 NOTE — ED PROVIDER NOTE - NEUROLOGICAL, MLM
Alert and oriented,  3/5 strength in BLE (baseline) Alert and oriented to person/ place; disoriented to date,  3/5 strength in BLE (baseline)

## 2019-10-13 NOTE — ED ADULT NURSE NOTE - CHIEF COMPLAINT QUOTE
Patient LAURE, sent from [facility], states weakness "all day", recently dx with UTI

## 2019-10-13 NOTE — ED ADULT NURSE NOTE - OBJECTIVE STATEMENT
pt LAURE from [facility] for reports of generalized weakness, pt recently dx and treated for UTI with course of Abx, completed today. pt is awake at baseline mental status. even and unlabored resps present, skin warm dry and intact. pt with no reports of pain. no nausea/vomiting.

## 2019-10-13 NOTE — ED ADULT NURSE NOTE - NSIMPLEMENTINTERV_GEN_ALL_ED
Implemented All Universal Safety Interventions:  [place] to call system. Call bell, personal items and telephone within reach. Instruct patient to call for assistance. Room bathroom lighting operational. Non-slip footwear when patient is off stretcher. Physically safe environment: no spills, clutter or unnecessary equipment. Stretcher in lowest position, wheels locked, appropriate side rails in place.

## 2019-10-13 NOTE — ED PROVIDER NOTE - PROGRESS NOTE DETAILS
As per sign-out from Dr. Garcia patient with very mild confusion presents for evaluation. IF uti and patient stable she recommends d/c back to [facility]. Patient continues to rest comfortably with very mild symptoms. UA pending, but difficult to obtain. UA is as noted. Patient to be discharged.

## 2019-10-13 NOTE — ED ADULT TRIAGE NOTE - CHIEF COMPLAINT QUOTE
Patient LAURE, sent from [facility], states weakness "all day", recently dx with UTI

## 2019-10-13 NOTE — ED PROVIDER NOTE - OBJECTIVE STATEMENT
Pt is a 89 y/o F, with PMHx of anemia, CKD, CAD s/p stent, HTN, HLD, BIBEMS from [facility] with daughter in law at bedside (son on the way who is health care proxy), presenting for evaluation of altered mental status. History obtained by daughter in law. Pt began "talking gibberish" with an episode of minimal responsiveness earlier today at the facility. Daughter states the pt has had episode three times in the past attributed to urosepsis. States the pt currently appears confused but with periods of lucidness with patient asking how daughter in law's mother's health was. Daughter in law also states pt spoke with another family member earlier today where pt stated she did not have much of an appetite today and was feeling tired. When asked, pt denies headache, chest pain, abd pain, dizziness. Pt was having dysuria two weeks ago, had a UA at the facility testing + for UTI, and placed on course of antibiotics that was completed. ROS limited otherwise secondary to AMS.

## 2019-10-13 NOTE — ED PROVIDER NOTE - CLINICAL SUMMARY MEDICAL DECISION MAKING FREE TEXT BOX
89yo F presenting with confusion x1 day. Suspected UTI per family. Awake, alert, but clearly confused. Will check labs, urine, cxr, ct head, likely admit. 89yo F presenting with confusion x1 day. Suspected UTI per family. Awake, alert, but clearly confused. Will check labs, urine, cxr, ct head, reeval

## 2019-10-13 NOTE — ED PROVIDER NOTE - PATIENT PORTAL LINK FT
You can access the FollowMyHealth Patient Portal offered by [facility] by registering at the following website: [email or web address]. By joining [email or web address]’s FollowMyHealth portal, you will also be able to view your health information using other applications (apps) compatible with our system.

## 2019-10-14 VITALS
TEMPERATURE: 98 F | SYSTOLIC BLOOD PRESSURE: 180 MMHG | HEART RATE: 64 BPM | OXYGEN SATURATION: 99 % | RESPIRATION RATE: 18 BRPM | DIASTOLIC BLOOD PRESSURE: 76 MMHG

## 2019-10-14 LAB
APPEARANCE UR: ABNORMAL
BACTERIA # UR AUTO: ABNORMAL
BILIRUB UR-MCNC: NEGATIVE — SIGNIFICANT CHANGE UP
COLOR SPEC: YELLOW — SIGNIFICANT CHANGE UP
DIFF PNL FLD: ABNORMAL
EPI CELLS # UR: SIGNIFICANT CHANGE UP
GLUCOSE UR QL: NEGATIVE — SIGNIFICANT CHANGE UP
KETONES UR-MCNC: NEGATIVE — SIGNIFICANT CHANGE UP
LEUKOCYTE ESTERASE UR-ACNC: ABNORMAL
NITRITE UR-MCNC: POSITIVE
PH UR: 6 — SIGNIFICANT CHANGE UP (ref 5–8)
PROT UR-MCNC: 30 MG/DL
RBC CASTS # UR COMP ASSIST: SIGNIFICANT CHANGE UP /HPF (ref 0–4)
SP GR SPEC: 1.01 — SIGNIFICANT CHANGE UP (ref 1.01–1.02)
UROBILINOGEN FLD QL: NEGATIVE — SIGNIFICANT CHANGE UP
WBC UR QL: ABNORMAL

## 2019-10-14 PROCEDURE — 70450 CT HEAD/BRAIN W/O DYE: CPT

## 2019-10-14 PROCEDURE — 85730 THROMBOPLASTIN TIME PARTIAL: CPT

## 2019-10-14 PROCEDURE — 36415 COLL VENOUS BLD VENIPUNCTURE: CPT

## 2019-10-14 PROCEDURE — 85610 PROTHROMBIN TIME: CPT

## 2019-10-14 PROCEDURE — 96374 THER/PROPH/DIAG INJ IV PUSH: CPT

## 2019-10-14 PROCEDURE — 80053 COMPREHEN METABOLIC PANEL: CPT

## 2019-10-14 PROCEDURE — 84484 ASSAY OF TROPONIN QUANT: CPT

## 2019-10-14 PROCEDURE — 71045 X-RAY EXAM CHEST 1 VIEW: CPT

## 2019-10-14 PROCEDURE — 81001 URINALYSIS AUTO W/SCOPE: CPT

## 2019-10-14 PROCEDURE — 87086 URINE CULTURE/COLONY COUNT: CPT

## 2019-10-14 PROCEDURE — 99284 EMERGENCY DEPT VISIT MOD MDM: CPT | Mod: 25

## 2019-10-14 PROCEDURE — 87186 SC STD MICRODIL/AGAR DIL: CPT

## 2019-10-14 PROCEDURE — 85027 COMPLETE CBC AUTOMATED: CPT

## 2019-10-14 RX ORDER — SODIUM CHLORIDE 9 MG/ML
500 INJECTION INTRAMUSCULAR; INTRAVENOUS; SUBCUTANEOUS ONCE
Refills: 0 | Status: COMPLETED | OUTPATIENT
Start: 2019-10-14 | End: 2019-10-14

## 2019-10-14 RX ORDER — CEFTRIAXONE 500 MG/1
1000 INJECTION, POWDER, FOR SOLUTION INTRAMUSCULAR; INTRAVENOUS ONCE
Refills: 0 | Status: COMPLETED | OUTPATIENT
Start: 2019-10-14 | End: 2019-10-14

## 2019-10-14 RX ADMIN — CEFTRIAXONE 100 MILLIGRAM(S): 500 INJECTION, POWDER, FOR SOLUTION INTRAMUSCULAR; INTRAVENOUS at 04:16

## 2019-10-14 RX ADMIN — SODIUM CHLORIDE 500 MILLILITER(S): 9 INJECTION INTRAMUSCULAR; INTRAVENOUS; SUBCUTANEOUS at 00:29

## 2019-10-14 NOTE — ED ADULT NURSE REASSESSMENT NOTE - NS ED NURSE REASSESS COMMENT FT1
Pt is A&OX3, pt resting in bed comfortably.  Pt denies pain, N/V/D. Pt VSS. Pt in NAD at this time. Pt moves all extremities equal and bilateral. Plan of care and wait time explained. Pt awaiting lab results. Safety maintained.

## 2019-10-16 LAB
-  AMIKACIN: SIGNIFICANT CHANGE UP
-  AZTREONAM: SIGNIFICANT CHANGE UP
-  CEFEPIME: SIGNIFICANT CHANGE UP
-  CEFTAZIDIME: SIGNIFICANT CHANGE UP
-  CIPROFLOXACIN: SIGNIFICANT CHANGE UP
-  GENTAMICIN: SIGNIFICANT CHANGE UP
-  IMIPENEM: SIGNIFICANT CHANGE UP
-  LEVOFLOXACIN: SIGNIFICANT CHANGE UP
-  MEROPENEM: SIGNIFICANT CHANGE UP
-  PIPERACILLIN/TAZOBACTAM: SIGNIFICANT CHANGE UP
-  TOBRAMYCIN: SIGNIFICANT CHANGE UP
CULTURE RESULTS: SIGNIFICANT CHANGE UP
METHOD TYPE: SIGNIFICANT CHANGE UP
ORGANISM # SPEC MICROSCOPIC CNT: SIGNIFICANT CHANGE UP
ORGANISM # SPEC MICROSCOPIC CNT: SIGNIFICANT CHANGE UP
SPECIMEN SOURCE: SIGNIFICANT CHANGE UP

## 2019-10-17 NOTE — ED POST DISCHARGE NOTE - DETAILS
Spoke with Nursing supervisor at [facility] who states pt has been on abx by [facility]. Told meds sensitive from urine culture. Nurse to report information to doctor.

## 2019-10-30 ENCOUNTER — APPOINTMENT (OUTPATIENT)
Dept: NEUROLOGY | Facility: CLINIC | Age: 84
End: 2019-10-30
Payer: MEDICARE

## 2019-10-30 VITALS
DIASTOLIC BLOOD PRESSURE: 60 MMHG | BODY MASS INDEX: 23.9 KG/M2 | WEIGHT: 140 LBS | HEIGHT: 64 IN | SYSTOLIC BLOOD PRESSURE: 130 MMHG

## 2019-10-30 DIAGNOSIS — G31.84 MILD COGNITIVE IMPAIRMENT, SO STATED: ICD-10-CM

## 2019-10-30 PROCEDURE — 99213 OFFICE O/P EST LOW 20 MIN: CPT

## 2019-10-30 RX ORDER — LORATADINE 10 MG
TABLET,DISINTEGRATING ORAL
Refills: 0 | Status: ACTIVE | COMMUNITY

## 2019-10-30 RX ORDER — SERTRALINE HYDROCHLORIDE 25 MG/1
25 TABLET, FILM COATED ORAL
Refills: 0 | Status: ACTIVE | COMMUNITY

## 2019-10-30 RX ORDER — DOCUSATE SODIUM 100 MG/1
100 CAPSULE, LIQUID FILLED ORAL
Refills: 0 | Status: COMPLETED | COMMUNITY
End: 2019-10-30

## 2019-10-30 RX ORDER — L.ACIDOPH/L.BULG/B.BIF/S.THERM 1B-250 MG
TABLET ORAL
Refills: 0 | Status: ACTIVE | COMMUNITY

## 2019-10-30 RX ORDER — LIDOCAINE AND PRILOCAINE 25; 25 MG/G; MG/G
2.5-2.5 CREAM TOPICAL
Refills: 0 | Status: ACTIVE | COMMUNITY

## 2019-10-30 RX ORDER — SERTRALINE HYDROCHLORIDE 25 MG/1
25 TABLET, FILM COATED ORAL
Refills: 0 | Status: COMPLETED | COMMUNITY
End: 2019-10-30

## 2019-10-30 RX ORDER — FLUTICASONE PROPIONATE 50 UG/1
50 SPRAY, METERED NASAL
Qty: 16 | Refills: 0 | Status: COMPLETED | COMMUNITY
Start: 2018-11-06 | End: 2019-10-30

## 2019-10-30 RX ORDER — HYDROCORTISONE 1 G/100G
1 CREAM TOPICAL
Refills: 0 | Status: COMPLETED | COMMUNITY
End: 2019-10-30

## 2019-10-30 RX ORDER — MUPIROCIN 20 MG/G
2 OINTMENT TOPICAL
Refills: 0 | Status: ACTIVE | COMMUNITY

## 2019-10-30 NOTE — PHYSICAL EXAM
[General Appearance - Alert] : alert [General Appearance - In No Acute Distress] : in no acute distress [Oriented To Time, Place, And Person] : oriented to person, place, and time [Memory Remote] : remote memory was not impaired [Cranial Nerves Optic (II)] : visual acuity intact bilaterally,  visual fields full to confrontation, pupils equal round and reactive to light [Cranial Nerves Oculomotor (III)] : extraocular motion intact [Cranial Nerves Trigeminal (V)] : facial sensation intact symmetrically [Cranial Nerves Facial (VII)] : face symmetrical [Cranial Nerves Vestibulocochlear (VIII)] : hearing was intact bilaterally [Cranial Nerves Glossopharyngeal (IX)] : tongue and palate midline [Cranial Nerves Accessory (XI - Cranial And Spinal)] : head turning and shoulder shrug symmetric [Cranial Nerves Hypoglossal (XII)] : there was no tongue deviation with protrusion [Motor Tone] : muscle tone was normal in all four extremities [Sensation Tactile Decrease] : light touch was intact [Sensation Pain / Temperature Decrease] : pain and temperature was intact [Sensation Vibration Decrease] : vibration was intact [1+] : Patella left 1+ [Optic Disc Abnormality] : the optic disc were normal in size and color [Edema] : there was no peripheral edema [Involuntary Movements] : no involuntary movements were seen [FreeTextEntry1] : Recent memory is poor. [Dysarthria] : no dysarthria [Aphasia] : no dysphasia/aphasia [Romberg's Sign] : Romberg's sign was negtive [Coordination - Dysmetria Impaired Finger-to-Nose Bilateral] : not present [Plantar Reflex Right Only] : normal on the right [Plantar Reflex Left Only] : normal on the left [FreeTextEntry8] : The patient presented in a wheelchair. She is unable to ambulate without assistance.

## 2019-10-30 NOTE — DATA REVIEWED
[de-identified] : Brain MRI was performed on 10/9/18.\par The study demonstrated evidence of left temporal surgery with resultant encephalomalacia and postoperative change.\par There was also chronic ischemic change. There was atrophy and ventriculomegaly.\par

## 2019-10-30 NOTE — ASSESSMENT
[FreeTextEntry1] : This is a 89 year-old woman with a prior history of meningioma resection.\par \par She has had recurrent urinary tract infections.\par \par She was found to have some atrophy and ventriculomegaly on MRI.\par Having seen her in the hospital, it was not our impression that she had a seizure.\par She has had no further such episodes.\par Should she have any further episodes of unresponsiveness in the absence of infection and further workup can be pursued with EEG.\par \par I would see her back in 6 months for reevaluation.\par I will see her sooner if needed.\par \par

## 2019-10-30 NOTE — CONSULT LETTER
[Dear  ___] : Dear ~KEIRA, [Courtesy Letter:] : I had the pleasure of seeing your patient, [unfilled], in my office today. [Please see my note below.] : Please see my note below. [Consult Closing:] : Thank you very much for allowing me to participate in the care of this patient.  If you have any questions, please do not hesitate to contact me. [Sincerely,] : Sincerely, [DrJay  ___] : Dr. PAULINO [FreeTextEntry3] : Prasanna Rosado MD.

## 2019-10-30 NOTE — HISTORY OF PRESENT ILLNESS
[FreeTextEntry1] : I saw this patient in the office today.\par \par She had been seen originally on 10/6/18 at [facility].\par She had presented with an acute confusional state in the setting of urinary tract infection.\par She was given t-PA in the emergency room as their first concern was possible stroke.\par Workup was negative for this.\par \par She is now at [facility].\par She has persisting periods of confusion.\par \par

## 2020-07-22 ENCOUNTER — APPOINTMENT (OUTPATIENT)
Dept: NEUROLOGY | Facility: CLINIC | Age: 85
End: 2020-07-22

## 2021-12-07 NOTE — ED POST DISCHARGE NOTE - RESULT SUMMARY
+ UC, no abx sent Render Note In Bullet Format When Appropriate: No Duration Of Freeze Thaw-Cycle (Seconds): 5 Show Aperture Variable?: Yes Detail Level: Detailed Number Of Freeze-Thaw Cycles: 1 freeze-thaw cycle Post-Care Instructions: I reviewed with the patient in detail post-care instructions. Patient is to wear sunprotection, and avoid picking at any of the treated lesions. Pt may apply Vaseline to crusted or scabbing areas. Consent: The patient's consent was obtained including but not limited to risks of crusting, scabbing, blistering, scarring, darker or lighter pigmentary change, recurrence, incomplete removal and infection.

## 2022-10-25 NOTE — STROKE CODE NOTE - ASSESSMENT/PLAN
89 Y/O woman with multiple vascular risk factors including HTN is evaluated at OSH for acute onset of language disturbance. She is reported to be at her baseline till 6:30 PM, when she was at nursing home when she was treated with IV cefrtiaxone for UTI (but has been on ABx-Cipro for about 3-4 days). She was noted to have "altered mental status" about 20 minutes thereafter. She was brought to OSH for further evaluation where her BP was noted to be on the lower side. She was treated with IV fluids but noted to be unresponsive to external stimuli. Examination shows drowsiness, disorientation to age and month, he will need to follow up with commands, generalized weakness, and global aphasia with mild dysarthria. CT brain on admission did not show any evidence of acute infarct or hemorrhage.    Impression:  Cerebral embolism with cerebral infarction. Probable left MCA distribution stroke - likely etiology being embolic stroke from undetermined source No

## 2023-02-16 NOTE — ED ADULT TRIAGE NOTE - STATUS:
[de-identified] : "Written by Alyce Mixon, acting as Scribe for Ike Mar MD."\par \par Dr. Mar - \par The documentation recorded by the scribe accurately reflects the service I personally performed and the decisions made by me. 
Applied

## 2023-05-18 NOTE — H&P ADULT - NS MD HP PULSE DORSALIS
Subjective   Patient ID: Jerri Allen Negative.     Respiratory:  Negative for cough, shortness of breath and wheezing.    Cardiovascular:  Negative for chest pain and leg swelling.   Gastrointestinal: Negative.    Genitourinary: Negative.    Musculoskeletal: Negative.    Neurological:  Negative for dizziness and headaches.   Psychiatric/Behavioral: Negative.         Objective   Physical Exam  Constitutional:       General: She is not in acute distress.     Appearance: She is obese.   Eyes:      Extraocular Movements: Extraocular movements intact.      Pupils: Pupils are equal, round, and reactive to light.   Cardiovascular:      Rate and Rhythm: Normal rate and regular rhythm.   Pulmonary:      Effort: Pulmonary effort is normal.      Breath sounds: Normal breath sounds.   Abdominal:      General: Bowel sounds are normal.      Palpations: Abdomen is soft.      Tenderness: There is no abdominal tenderness.   Musculoskeletal:         General: No swelling or tenderness.   Neurological:      Mental Status: She is alert and oriented to person, place, and time.   Psychiatric:         Mood and Affect: Mood normal.         Behavior: Behavior normal.         Jerri was seen today for hypertension.  Diagnoses and all orders for this visit:  Elevated BP without diagnosis of hypertension (Primary)  Comments:  BP today is 134/86. Goal is BP ,130/80.  Plan:  Resume the DASH diet  -Resume regular exercise and weight reduction  -F/U in 1 mth for elevated BP  Class 2 obesity with body mass index (BMI) of 38.0 to 38.9 in adult, unspecified obesity type, unspecified whether serious comorbidity present  Comments:  Current BMI is 38.12kg/M2.   Plan: resume regular exercise and weight reduction  -F/U with weight management as  scheduled             left normal/right normal

## 2023-12-31 NOTE — PATIENT PROFILE ADULT. - MEDICATION HERBAL REMEDIES, PROFILE
"Patient underwent ORIF on 9/14/2023. Course was complicated by superficial infection that was treated with Keflex and wound care. Per chart review, follow up on 12/19/23 was uneventful and wound was completely closed. He presented to [facility] 12/29/23 with warm, swollen ankle that eventually developed into open, bleeding blisters along incision line. Labs significant for WBC 13.44, .3, ESR > 120. Blood cultures were taken.. XR right ankle revealed "ORIF lateral malleolus with syndesmotic screws, no hardware lucency or complication. Overlying soft tissue edema. Fracture line is visible with no significant bridging cortex." CT right ankle with contrast revealed "ORIF right ankle as stated above, incomplete healing of fracture, superficial edema consistent with cellulitis versus possible abscess."    Plan:  - [facility] consulted; appreciate recs  " no

## 2024-06-01 NOTE — ED ADULT NURSE NOTE - NS TRANSFER PATIENT BELONGINGS
Is This A New Presentation, Or A Follow-Up?: Phototherapy Treatment pillow; knecklace with family 01-Jun-2024 18:28